# Patient Record
Sex: FEMALE | Race: WHITE | Employment: FULL TIME | URBAN - METROPOLITAN AREA
[De-identification: names, ages, dates, MRNs, and addresses within clinical notes are randomized per-mention and may not be internally consistent; named-entity substitution may affect disease eponyms.]

---

## 2022-03-08 ENCOUNTER — APPOINTMENT (OUTPATIENT)
Dept: GENERAL RADIOLOGY | Age: 62
End: 2022-03-08
Payer: COMMERCIAL

## 2022-03-08 ENCOUNTER — HOSPITAL ENCOUNTER (OUTPATIENT)
Age: 62
Setting detail: OBSERVATION
Discharge: HOME | End: 2022-03-10
Attending: EMERGENCY MEDICINE | Admitting: EMERGENCY MEDICINE
Payer: COMMERCIAL

## 2022-03-08 ENCOUNTER — APPOINTMENT (OUTPATIENT)
Dept: CT IMAGING | Age: 62
End: 2022-03-08
Payer: COMMERCIAL

## 2022-03-08 DIAGNOSIS — R07.9 CHEST PAIN, UNSPECIFIED TYPE: Primary | ICD-10-CM

## 2022-03-08 LAB
ABSOLUTE EOS #: 0.03 K/UL (ref 0–0.44)
ABSOLUTE IMMATURE GRANULOCYTE: 0.04 K/UL (ref 0–0.3)
ABSOLUTE LYMPH #: 0.8 K/UL (ref 1.1–3.7)
ABSOLUTE MONO #: 0.84 K/UL (ref 0.1–1.2)
ANION GAP SERPL CALCULATED.3IONS-SCNC: 17 MMOL/L (ref 9–17)
ANION GAP: 14 MMOL/L (ref 7–16)
BASOPHILS # BLD: 0 % (ref 0–2)
BASOPHILS ABSOLUTE: 0.04 K/UL (ref 0–0.2)
BUN BLDV-MCNC: 11 MG/DL (ref 8–23)
CALCIUM SERPL-MCNC: 9.8 MG/DL (ref 8.6–10.4)
CHLORIDE BLD-SCNC: 96 MMOL/L (ref 98–107)
CO2: 22 MMOL/L (ref 20–31)
CREAT SERPL-MCNC: 1.01 MG/DL (ref 0.5–0.9)
EOSINOPHILS RELATIVE PERCENT: 0 % (ref 1–4)
GFR AFRICAN AMERICAN: >60 ML/MIN
GFR NON-AFRICAN AMERICAN: 55 ML/MIN
GFR NON-AFRICAN AMERICAN: 59 ML/MIN
GFR SERPL CREATININE-BSD FRML MDRD: >60 ML/MIN
GFR SERPL CREATININE-BSD FRML MDRD: ABNORMAL ML/MIN/{1.73_M2}
GFR SERPL CREATININE-BSD FRML MDRD: ABNORMAL ML/MIN/{1.73_M2}
GLUCOSE BLD-MCNC: 158 MG/DL (ref 74–100)
GLUCOSE BLD-MCNC: 160 MG/DL (ref 70–99)
HCO3 VENOUS: 25.3 MMOL/L (ref 22–29)
HCT VFR BLD CALC: 45.3 % (ref 36.3–47.1)
HEMOGLOBIN: 14.7 G/DL (ref 11.9–15.1)
IMMATURE GRANULOCYTES: 0 %
LYMPHOCYTES # BLD: 8 % (ref 24–43)
MAGNESIUM: 1.9 MG/DL (ref 1.6–2.6)
MCH RBC QN AUTO: 30.7 PG (ref 25.2–33.5)
MCHC RBC AUTO-ENTMCNC: 32.5 G/DL (ref 28.4–34.8)
MCV RBC AUTO: 94.6 FL (ref 82.6–102.9)
MONOCYTES # BLD: 9 % (ref 3–12)
NEGATIVE BASE EXCESS, VEN: 1 (ref 0–2)
NRBC AUTOMATED: 0 PER 100 WBC
O2 SAT, VEN: 28 % (ref 60–85)
PCO2, VEN: 46.1 MM HG (ref 41–51)
PDW BLD-RTO: 13.4 % (ref 11.8–14.4)
PH VENOUS: 7.35 (ref 7.32–7.43)
PLATELET # BLD: 338 K/UL (ref 138–453)
PMV BLD AUTO: 9.7 FL (ref 8.1–13.5)
PO2, VEN: 19.5 MM HG (ref 30–50)
POC BUN: 10 MG/DL (ref 8–26)
POC CHLORIDE: 102 MMOL/L (ref 98–107)
POC CREATININE: 0.95 MG/DL (ref 0.51–1.19)
POC HEMATOCRIT: 47 % (ref 36–46)
POC HEMOGLOBIN: 16.1 G/DL (ref 12–16)
POC IONIZED CALCIUM: 1.19 MMOL/L (ref 1.15–1.33)
POC LACTIC ACID: 2.67 MMOL/L (ref 0.56–1.39)
POC POTASSIUM: 3.3 MMOL/L (ref 3.5–4.5)
POC SODIUM: 141 MMOL/L (ref 138–146)
POC TCO2: 26 MMOL/L (ref 22–30)
POTASSIUM SERPL-SCNC: 3.4 MMOL/L (ref 3.7–5.3)
PRO-BNP: 31 PG/ML
RBC # BLD: 4.79 M/UL (ref 3.95–5.11)
SEG NEUTROPHILS: 83 % (ref 36–65)
SEGMENTED NEUTROPHILS ABSOLUTE COUNT: 8 K/UL (ref 1.5–8.1)
SODIUM BLD-SCNC: 135 MMOL/L (ref 135–144)
TROPONIN, HIGH SENSITIVITY: 29 NG/L (ref 0–14)
TROPONIN, HIGH SENSITIVITY: 33 NG/L (ref 0–14)
WBC # BLD: 9.8 K/UL (ref 3.5–11.3)

## 2022-03-08 PROCEDURE — 84520 ASSAY OF UREA NITROGEN: CPT

## 2022-03-08 PROCEDURE — 96360 HYDRATION IV INFUSION INIT: CPT

## 2022-03-08 PROCEDURE — 85025 COMPLETE CBC W/AUTO DIFF WBC: CPT

## 2022-03-08 PROCEDURE — 6370000000 HC RX 637 (ALT 250 FOR IP): Performed by: EMERGENCY MEDICINE

## 2022-03-08 PROCEDURE — 85014 HEMATOCRIT: CPT

## 2022-03-08 PROCEDURE — 96361 HYDRATE IV INFUSION ADD-ON: CPT

## 2022-03-08 PROCEDURE — 82947 ASSAY GLUCOSE BLOOD QUANT: CPT

## 2022-03-08 PROCEDURE — G0378 HOSPITAL OBSERVATION PER HR: HCPCS

## 2022-03-08 PROCEDURE — 93005 ELECTROCARDIOGRAM TRACING: CPT | Performed by: EMERGENCY MEDICINE

## 2022-03-08 PROCEDURE — 99285 EMERGENCY DEPT VISIT HI MDM: CPT

## 2022-03-08 PROCEDURE — 80051 ELECTROLYTE PANEL: CPT

## 2022-03-08 PROCEDURE — 83735 ASSAY OF MAGNESIUM: CPT

## 2022-03-08 PROCEDURE — 82330 ASSAY OF CALCIUM: CPT

## 2022-03-08 PROCEDURE — 83605 ASSAY OF LACTIC ACID: CPT

## 2022-03-08 PROCEDURE — 6360000004 HC RX CONTRAST MEDICATION: Performed by: EMERGENCY MEDICINE

## 2022-03-08 PROCEDURE — 83880 ASSAY OF NATRIURETIC PEPTIDE: CPT

## 2022-03-08 PROCEDURE — 87635 SARS-COV-2 COVID-19 AMP PRB: CPT

## 2022-03-08 PROCEDURE — 82565 ASSAY OF CREATININE: CPT

## 2022-03-08 PROCEDURE — 71045 X-RAY EXAM CHEST 1 VIEW: CPT

## 2022-03-08 PROCEDURE — 70496 CT ANGIOGRAPHY HEAD: CPT

## 2022-03-08 PROCEDURE — 84484 ASSAY OF TROPONIN QUANT: CPT

## 2022-03-08 PROCEDURE — 80048 BASIC METABOLIC PNL TOTAL CA: CPT

## 2022-03-08 PROCEDURE — 70450 CT HEAD/BRAIN W/O DYE: CPT

## 2022-03-08 PROCEDURE — 82803 BLOOD GASES ANY COMBINATION: CPT

## 2022-03-08 PROCEDURE — 2580000003 HC RX 258: Performed by: EMERGENCY MEDICINE

## 2022-03-08 RX ORDER — ACETAMINOPHEN 650 MG/1
650 SUPPOSITORY RECTAL EVERY 6 HOURS PRN
Status: DISCONTINUED | OUTPATIENT
Start: 2022-03-08 | End: 2022-03-10 | Stop reason: HOSPADM

## 2022-03-08 RX ORDER — POLYETHYLENE GLYCOL 3350 17 G/17G
17 POWDER, FOR SOLUTION ORAL DAILY PRN
Status: DISCONTINUED | OUTPATIENT
Start: 2022-03-08 | End: 2022-03-10 | Stop reason: HOSPADM

## 2022-03-08 RX ORDER — 0.9 % SODIUM CHLORIDE 0.9 %
500 INTRAVENOUS SOLUTION INTRAVENOUS ONCE
Status: COMPLETED | OUTPATIENT
Start: 2022-03-08 | End: 2022-03-08

## 2022-03-08 RX ORDER — SODIUM CHLORIDE 0.9 % (FLUSH) 0.9 %
5-40 SYRINGE (ML) INJECTION PRN
Status: DISCONTINUED | OUTPATIENT
Start: 2022-03-08 | End: 2022-03-10 | Stop reason: HOSPADM

## 2022-03-08 RX ORDER — ONDANSETRON 4 MG/1
4 TABLET, ORALLY DISINTEGRATING ORAL EVERY 8 HOURS PRN
Status: DISCONTINUED | OUTPATIENT
Start: 2022-03-08 | End: 2022-03-10 | Stop reason: HOSPADM

## 2022-03-08 RX ORDER — SODIUM CHLORIDE 9 MG/ML
25 INJECTION, SOLUTION INTRAVENOUS PRN
Status: DISCONTINUED | OUTPATIENT
Start: 2022-03-08 | End: 2022-03-10 | Stop reason: HOSPADM

## 2022-03-08 RX ORDER — SODIUM CHLORIDE 0.9 % (FLUSH) 0.9 %
5-40 SYRINGE (ML) INJECTION EVERY 12 HOURS SCHEDULED
Status: DISCONTINUED | OUTPATIENT
Start: 2022-03-08 | End: 2022-03-10 | Stop reason: HOSPADM

## 2022-03-08 RX ORDER — POTASSIUM CHLORIDE 20 MEQ/1
20 TABLET, EXTENDED RELEASE ORAL ONCE
Status: COMPLETED | OUTPATIENT
Start: 2022-03-08 | End: 2022-03-08

## 2022-03-08 RX ORDER — ACETAMINOPHEN 325 MG/1
650 TABLET ORAL EVERY 6 HOURS PRN
Status: DISCONTINUED | OUTPATIENT
Start: 2022-03-08 | End: 2022-03-10 | Stop reason: HOSPADM

## 2022-03-08 RX ORDER — ONDANSETRON 2 MG/ML
4 INJECTION INTRAMUSCULAR; INTRAVENOUS EVERY 6 HOURS PRN
Status: DISCONTINUED | OUTPATIENT
Start: 2022-03-08 | End: 2022-03-10 | Stop reason: HOSPADM

## 2022-03-08 RX ADMIN — IOPAMIDOL 90 ML: 755 INJECTION, SOLUTION INTRAVENOUS at 20:56

## 2022-03-08 RX ADMIN — POTASSIUM CHLORIDE 20 MEQ: 1500 TABLET, EXTENDED RELEASE ORAL at 22:26

## 2022-03-08 RX ADMIN — SODIUM CHLORIDE 500 ML: 0.9 INJECTION, SOLUTION INTRAVENOUS at 21:08

## 2022-03-08 ASSESSMENT — ENCOUNTER SYMPTOMS
NAUSEA: 0
CONSTIPATION: 0
COLOR CHANGE: 0
VOMITING: 0
CHEST TIGHTNESS: 1
DIARRHEA: 0
ABDOMINAL PAIN: 0
SHORTNESS OF BREATH: 1

## 2022-03-09 ENCOUNTER — APPOINTMENT (OUTPATIENT)
Dept: NUCLEAR MEDICINE | Age: 62
End: 2022-03-09
Payer: COMMERCIAL

## 2022-03-09 ENCOUNTER — APPOINTMENT (OUTPATIENT)
Dept: MRI IMAGING | Age: 62
End: 2022-03-09
Payer: COMMERCIAL

## 2022-03-09 LAB
ABSOLUTE EOS #: 0.06 K/UL (ref 0–0.44)
ABSOLUTE IMMATURE GRANULOCYTE: 0 K/UL (ref 0–0.3)
ABSOLUTE LYMPH #: 0.66 K/UL (ref 1.1–3.7)
ABSOLUTE MONO #: 0.72 K/UL (ref 0.1–1.2)
ANION GAP SERPL CALCULATED.3IONS-SCNC: 13 MMOL/L (ref 9–17)
BASOPHILS # BLD: 1 % (ref 0–2)
BASOPHILS ABSOLUTE: 0.06 K/UL (ref 0–0.2)
BUN BLDV-MCNC: 9 MG/DL (ref 8–23)
CALCIUM SERPL-MCNC: 9.5 MG/DL (ref 8.6–10.4)
CHLORIDE BLD-SCNC: 98 MMOL/L (ref 98–107)
CO2: 21 MMOL/L (ref 20–31)
CREAT SERPL-MCNC: 0.8 MG/DL (ref 0.5–0.9)
EKG ATRIAL RATE: 116 BPM
EKG ATRIAL RATE: 83 BPM
EKG P AXIS: 13 DEGREES
EKG P AXIS: 48 DEGREES
EKG P-R INTERVAL: 134 MS
EKG P-R INTERVAL: 140 MS
EKG Q-T INTERVAL: 328 MS
EKG Q-T INTERVAL: 380 MS
EKG QRS DURATION: 86 MS
EKG QRS DURATION: 86 MS
EKG QTC CALCULATION (BAZETT): 446 MS
EKG QTC CALCULATION (BAZETT): 455 MS
EKG R AXIS: 31 DEGREES
EKG R AXIS: 51 DEGREES
EKG T AXIS: 54 DEGREES
EKG T AXIS: 73 DEGREES
EKG VENTRICULAR RATE: 116 BPM
EKG VENTRICULAR RATE: 83 BPM
EOSINOPHILS RELATIVE PERCENT: 1 % (ref 1–4)
ESTIMATED AVERAGE GLUCOSE: 163 MG/DL
GFR AFRICAN AMERICAN: >60 ML/MIN
GFR NON-AFRICAN AMERICAN: >60 ML/MIN
GFR SERPL CREATININE-BSD FRML MDRD: ABNORMAL ML/MIN/{1.73_M2}
GLUCOSE BLD-MCNC: 118 MG/DL (ref 65–105)
GLUCOSE BLD-MCNC: 128 MG/DL (ref 65–105)
GLUCOSE BLD-MCNC: 136 MG/DL (ref 65–105)
GLUCOSE BLD-MCNC: 137 MG/DL (ref 70–99)
GLUCOSE BLD-MCNC: 141 MG/DL (ref 65–105)
GLUCOSE BLD-MCNC: 169 MG/DL (ref 65–105)
HBA1C MFR BLD: 7.3 % (ref 4–6)
HCT VFR BLD CALC: 41.3 % (ref 36.3–47.1)
HEMOGLOBIN: 13.7 G/DL (ref 11.9–15.1)
IMMATURE GRANULOCYTES: 0 %
LV EF: 63 %
LVEF MODALITY: NORMAL
LYMPHOCYTES # BLD: 12 % (ref 24–43)
MCH RBC QN AUTO: 30.9 PG (ref 25.2–33.5)
MCHC RBC AUTO-ENTMCNC: 33.2 G/DL (ref 28.4–34.8)
MCV RBC AUTO: 93.2 FL (ref 82.6–102.9)
MONOCYTES # BLD: 13 % (ref 3–12)
MORPHOLOGY: NORMAL
NRBC AUTOMATED: 0 PER 100 WBC
PDW BLD-RTO: 13.7 % (ref 11.8–14.4)
PLATELET # BLD: 325 K/UL (ref 138–453)
PMV BLD AUTO: 10 FL (ref 8.1–13.5)
POTASSIUM SERPL-SCNC: 4 MMOL/L (ref 3.7–5.3)
RBC # BLD: 4.43 M/UL (ref 3.95–5.11)
SARS-COV-2, RAPID: NOT DETECTED
SEG NEUTROPHILS: 73 % (ref 36–65)
SEGMENTED NEUTROPHILS ABSOLUTE COUNT: 4 K/UL (ref 1.5–8.1)
SODIUM BLD-SCNC: 132 MMOL/L (ref 135–144)
SPECIMEN DESCRIPTION: NORMAL
TROPONIN, HIGH SENSITIVITY: 22 NG/L (ref 0–14)
TROPONIN, HIGH SENSITIVITY: 26 NG/L (ref 0–14)
TSH SERPL DL<=0.05 MIU/L-ACNC: 1.32 MIU/L (ref 0.3–5)
WBC # BLD: 5.5 K/UL (ref 3.5–11.3)

## 2022-03-09 PROCEDURE — 6370000000 HC RX 637 (ALT 250 FOR IP): Performed by: EMERGENCY MEDICINE

## 2022-03-09 PROCEDURE — 93010 ELECTROCARDIOGRAM REPORT: CPT | Performed by: INTERNAL MEDICINE

## 2022-03-09 PROCEDURE — G0378 HOSPITAL OBSERVATION PER HR: HCPCS

## 2022-03-09 PROCEDURE — 3430000000 HC RX DIAGNOSTIC RADIOPHARMACEUTICAL: Performed by: EMERGENCY MEDICINE

## 2022-03-09 PROCEDURE — 80048 BASIC METABOLIC PNL TOTAL CA: CPT

## 2022-03-09 PROCEDURE — 6370000000 HC RX 637 (ALT 250 FOR IP): Performed by: STUDENT IN AN ORGANIZED HEALTH CARE EDUCATION/TRAINING PROGRAM

## 2022-03-09 PROCEDURE — 83036 HEMOGLOBIN GLYCOSYLATED A1C: CPT

## 2022-03-09 PROCEDURE — 82947 ASSAY GLUCOSE BLOOD QUANT: CPT

## 2022-03-09 PROCEDURE — 84443 ASSAY THYROID STIM HORMONE: CPT

## 2022-03-09 PROCEDURE — 84484 ASSAY OF TROPONIN QUANT: CPT

## 2022-03-09 PROCEDURE — 36415 COLL VENOUS BLD VENIPUNCTURE: CPT

## 2022-03-09 PROCEDURE — 70551 MRI BRAIN STEM W/O DYE: CPT

## 2022-03-09 PROCEDURE — 93017 CV STRESS TEST TRACING ONLY: CPT

## 2022-03-09 PROCEDURE — A9500 TC99M SESTAMIBI: HCPCS | Performed by: EMERGENCY MEDICINE

## 2022-03-09 PROCEDURE — 78452 HT MUSCLE IMAGE SPECT MULT: CPT

## 2022-03-09 PROCEDURE — 93005 ELECTROCARDIOGRAM TRACING: CPT | Performed by: EMERGENCY MEDICINE

## 2022-03-09 PROCEDURE — 85025 COMPLETE CBC W/AUTO DIFF WBC: CPT

## 2022-03-09 PROCEDURE — 99219 PR INITIAL OBSERVATION CARE/DAY 50 MINUTES: CPT | Performed by: PSYCHIATRY & NEUROLOGY

## 2022-03-09 PROCEDURE — 6360000002 HC RX W HCPCS: Performed by: EMERGENCY MEDICINE

## 2022-03-09 PROCEDURE — 2580000003 HC RX 258: Performed by: EMERGENCY MEDICINE

## 2022-03-09 RX ORDER — ATROPINE SULFATE 0.1 MG/ML
0.5 INJECTION INTRAVENOUS EVERY 5 MIN PRN
Status: DISCONTINUED | OUTPATIENT
Start: 2022-03-09 | End: 2022-03-09 | Stop reason: ALTCHOICE

## 2022-03-09 RX ORDER — ATORVASTATIN CALCIUM 40 MG/1
40 TABLET, FILM COATED ORAL DAILY
Status: DISCONTINUED | OUTPATIENT
Start: 2022-03-09 | End: 2022-03-10

## 2022-03-09 RX ORDER — PANTOPRAZOLE SODIUM 40 MG/1
40 TABLET, DELAYED RELEASE ORAL
Status: DISCONTINUED | OUTPATIENT
Start: 2022-03-09 | End: 2022-03-10 | Stop reason: HOSPADM

## 2022-03-09 RX ORDER — NITROGLYCERIN 0.4 MG/1
0.4 TABLET SUBLINGUAL EVERY 5 MIN PRN
Status: DISCONTINUED | OUTPATIENT
Start: 2022-03-09 | End: 2022-03-09 | Stop reason: ALTCHOICE

## 2022-03-09 RX ORDER — CLOPIDOGREL BISULFATE 75 MG/1
75 TABLET ORAL DAILY
Status: DISCONTINUED | OUTPATIENT
Start: 2022-03-10 | End: 2022-03-10 | Stop reason: HOSPADM

## 2022-03-09 RX ORDER — CLOPIDOGREL 300 MG/1
300 TABLET, FILM COATED ORAL ONCE
Status: COMPLETED | OUTPATIENT
Start: 2022-03-09 | End: 2022-03-09

## 2022-03-09 RX ORDER — METOPROLOL TARTRATE 5 MG/5ML
5 INJECTION INTRAVENOUS EVERY 5 MIN PRN
Status: DISCONTINUED | OUTPATIENT
Start: 2022-03-09 | End: 2022-03-09 | Stop reason: ALTCHOICE

## 2022-03-09 RX ORDER — AMINOPHYLLINE DIHYDRATE 25 MG/ML
50 INJECTION, SOLUTION INTRAVENOUS PRN
Status: DISCONTINUED | OUTPATIENT
Start: 2022-03-09 | End: 2022-03-09 | Stop reason: ALTCHOICE

## 2022-03-09 RX ORDER — MELOXICAM 5 MG/1
5 CAPSULE ORAL DAILY
Status: DISCONTINUED | OUTPATIENT
Start: 2022-03-09 | End: 2022-03-10 | Stop reason: HOSPADM

## 2022-03-09 RX ORDER — CITALOPRAM 20 MG/1
40 TABLET ORAL DAILY
Status: DISCONTINUED | OUTPATIENT
Start: 2022-03-09 | End: 2022-03-10 | Stop reason: HOSPADM

## 2022-03-09 RX ORDER — SODIUM CHLORIDE 0.9 % (FLUSH) 0.9 %
10 SYRINGE (ML) INJECTION PRN
Status: DISCONTINUED | OUTPATIENT
Start: 2022-03-09 | End: 2022-03-10 | Stop reason: HOSPADM

## 2022-03-09 RX ORDER — ALBUTEROL SULFATE 90 UG/1
2 AEROSOL, METERED RESPIRATORY (INHALATION) PRN
Status: DISCONTINUED | OUTPATIENT
Start: 2022-03-09 | End: 2022-03-09 | Stop reason: ALTCHOICE

## 2022-03-09 RX ORDER — ASPIRIN 81 MG/1
81 TABLET, CHEWABLE ORAL DAILY
Status: DISCONTINUED | OUTPATIENT
Start: 2022-03-10 | End: 2022-03-10 | Stop reason: HOSPADM

## 2022-03-09 RX ORDER — ASPIRIN 325 MG
325 TABLET ORAL ONCE
Status: COMPLETED | OUTPATIENT
Start: 2022-03-09 | End: 2022-03-09

## 2022-03-09 RX ORDER — AMLODIPINE BESYLATE 10 MG/1
10 TABLET ORAL DAILY
Status: DISCONTINUED | OUTPATIENT
Start: 2022-03-09 | End: 2022-03-10 | Stop reason: HOSPADM

## 2022-03-09 RX ORDER — LISINOPRIL 5 MG/1
5 TABLET ORAL DAILY
Status: DISCONTINUED | OUTPATIENT
Start: 2022-03-09 | End: 2022-03-10 | Stop reason: HOSPADM

## 2022-03-09 RX ORDER — IBUPROFEN 400 MG/1
600 TABLET ORAL EVERY 6 HOURS PRN
Status: DISCONTINUED | OUTPATIENT
Start: 2022-03-09 | End: 2022-03-10 | Stop reason: HOSPADM

## 2022-03-09 RX ORDER — SODIUM CHLORIDE 9 MG/ML
500 INJECTION, SOLUTION INTRAVENOUS CONTINUOUS PRN
Status: DISCONTINUED | OUTPATIENT
Start: 2022-03-09 | End: 2022-03-09 | Stop reason: ALTCHOICE

## 2022-03-09 RX ORDER — SODIUM CHLORIDE 0.9 % (FLUSH) 0.9 %
5-40 SYRINGE (ML) INJECTION PRN
Status: DISCONTINUED | OUTPATIENT
Start: 2022-03-09 | End: 2022-03-09 | Stop reason: ALTCHOICE

## 2022-03-09 RX ORDER — ISOSORBIDE MONONITRATE 30 MG/1
30 TABLET, EXTENDED RELEASE ORAL DAILY
Status: DISCONTINUED | OUTPATIENT
Start: 2022-03-09 | End: 2022-03-10 | Stop reason: HOSPADM

## 2022-03-09 RX ADMIN — METOPROLOL TARTRATE 25 MG: 25 TABLET ORAL at 12:42

## 2022-03-09 RX ADMIN — REGADENOSON 0.4 MG: 0.08 INJECTION, SOLUTION INTRAVENOUS at 10:39

## 2022-03-09 RX ADMIN — METOPROLOL TARTRATE 25 MG: 25 TABLET ORAL at 01:58

## 2022-03-09 RX ADMIN — PANTOPRAZOLE SODIUM 40 MG: 40 TABLET, DELAYED RELEASE ORAL at 12:42

## 2022-03-09 RX ADMIN — TETRAKIS(2-METHOXYISOBUTYLISOCYANIDE)COPPER(I) TETRAFLUOROBORATE 13 MILLICURIE: 1 INJECTION, POWDER, LYOPHILIZED, FOR SOLUTION INTRAVENOUS at 09:00

## 2022-03-09 RX ADMIN — SODIUM CHLORIDE, PRESERVATIVE FREE 10 ML: 5 INJECTION INTRAVENOUS at 01:39

## 2022-03-09 RX ADMIN — CITALOPRAM 40 MG: 20 TABLET, FILM COATED ORAL at 12:42

## 2022-03-09 RX ADMIN — SODIUM CHLORIDE, PRESERVATIVE FREE 10 ML: 5 INJECTION INTRAVENOUS at 09:00

## 2022-03-09 RX ADMIN — CLOPIDOGREL BISULFATE 300 MG: 300 TABLET, FILM COATED ORAL at 14:43

## 2022-03-09 RX ADMIN — SODIUM CHLORIDE, PRESERVATIVE FREE 10 ML: 5 INJECTION INTRAVENOUS at 20:34

## 2022-03-09 RX ADMIN — SODIUM CHLORIDE, PRESERVATIVE FREE 10 ML: 5 INJECTION INTRAVENOUS at 10:37

## 2022-03-09 RX ADMIN — AMLODIPINE BESYLATE 10 MG: 10 TABLET ORAL at 12:42

## 2022-03-09 RX ADMIN — ISOSORBIDE MONONITRATE 30 MG: 30 TABLET ORAL at 12:42

## 2022-03-09 RX ADMIN — TETRAKIS(2-METHOXYISOBUTYLISOCYANIDE)COPPER(I) TETRAFLUOROBORATE 42 MILLICURIE: 1 INJECTION, POWDER, LYOPHILIZED, FOR SOLUTION INTRAVENOUS at 10:37

## 2022-03-09 RX ADMIN — METOPROLOL TARTRATE 25 MG: 25 TABLET ORAL at 20:34

## 2022-03-09 RX ADMIN — DESMOPRESSIN ACETATE 40 MG: 0.2 TABLET ORAL at 12:42

## 2022-03-09 RX ADMIN — IBUPROFEN 600 MG: 400 TABLET, FILM COATED ORAL at 23:26

## 2022-03-09 RX ADMIN — SODIUM CHLORIDE, PRESERVATIVE FREE 10 ML: 5 INJECTION INTRAVENOUS at 08:47

## 2022-03-09 RX ADMIN — ASPIRIN 325 MG: 325 TABLET ORAL at 14:43

## 2022-03-09 RX ADMIN — SODIUM CHLORIDE, PRESERVATIVE FREE 10 ML: 5 INJECTION INTRAVENOUS at 10:40

## 2022-03-09 RX ADMIN — ACETAMINOPHEN 650 MG: 325 TABLET ORAL at 20:34

## 2022-03-09 RX ADMIN — LISINOPRIL 5 MG: 5 TABLET ORAL at 12:42

## 2022-03-09 ASSESSMENT — PAIN SCALES - GENERAL
PAINLEVEL_OUTOF10: 0
PAINLEVEL_OUTOF10: 5
PAINLEVEL_OUTOF10: 8
PAINLEVEL_OUTOF10: 0
PAINLEVEL_OUTOF10: 4
PAINLEVEL_OUTOF10: 0
PAINLEVEL_OUTOF10: 0
PAINLEVEL_OUTOF10: 3

## 2022-03-09 ASSESSMENT — ENCOUNTER SYMPTOMS
COUGH: 0
TROUBLE SWALLOWING: 0
CONSTIPATION: 0
VOMITING: 0
DIARRHEA: 0
BACK PAIN: 0
ABDOMINAL DISTENTION: 0
SHORTNESS OF BREATH: 0
ABDOMINAL PAIN: 0
CHEST TIGHTNESS: 0
SORE THROAT: 0

## 2022-03-09 ASSESSMENT — PAIN - FUNCTIONAL ASSESSMENT: PAIN_FUNCTIONAL_ASSESSMENT: ACTIVITIES ARE NOT PREVENTED

## 2022-03-09 ASSESSMENT — PAIN DESCRIPTION - PROGRESSION: CLINICAL_PROGRESSION: GRADUALLY IMPROVING

## 2022-03-09 NOTE — CONSULTS
citalopram  40 mg Oral Daily    isosorbide mononitrate  30 mg Oral Daily    lisinopril  5 mg Oral Daily    Meloxicam  5 mg Oral Daily    metoprolol tartrate  25 mg Oral BID    pantoprazole  40 mg Oral QAM AC    atorvastatin  40 mg Oral Daily    sodium chloride flush  5-40 mL IntraVENous 2 times per day    enoxaparin  40 mg SubCUTAneous Daily       Allergies:   Natasha Dailey is allergic to bactrim [sulfamethoxazole-trimethoprim]. ROS:   Constitutional Negative for fever and chills   HEENT Negative for ear discharge, ear pain, nosebleed   Eyes Negative for photophobia, pain and discharge   Respiratory Negative for hemoptysis and sputum   Cardiovascular Negative for orthopnea, claudication and PND   Gastrointestinal Negative for abdominal pain, diarrhea, blood in stool   Musculoskeletal Negative for joint pain, negative for myalgia   Skin Negative for rash or itching   hematology Negative for ecchymosis, anemia   Psychiatric Negative for suicidal ideation, anxiety, depression, hallucinations       Objective:   BP (!) 144/101   Pulse 99   Temp 97 °F (36.1 °C) (Oral)   Resp 18   Ht 5' 7\" (1.702 m)   Wt 210 lb (95.3 kg)   SpO2 97%   BMI 32.89 kg/m²         General examination:    HEENT: Normocephalic, atraumatic, neck supple (-)nuchal rigidity  Lungs: Respirations unlabored, chest wall no deformity  Heart: Regular rate rhythm  Abdomen: Soft, non distended, non tender  Extremities: No cyanosis, clubbing or edema, 2+ pulses  Skin: Intact, normal skin color, no ecchymosis    Neurological examination:    Mental status   Alert and oriented x 3; following all commands; speech is fluent, no dysarthria, aphasia.       Cranial nerves   II - visual fields intact to confrontation; pupils reactive  III, IV, VI  extraocular muscles intact; no AIMEE; no nystagmus; no ptosis   V - normal facial sensation                                                               VII - normal facial symmetry VIII - intact hearing                                                                             IX, X - symmetrical palate elevation                                               XI - symmetrical shoulder shrug                                                       XII - midline tongue without atrophy or fasciculation     Motor function  Strength: 5/5 RUE, 5/5 RLE, 5/5 LUE, 5/5  LLE  Normal bulk and tone. No tremors                      Sensory function Intact to touch, pin, vibration, proprioception throughout     Cerebellar Intact finger-nose-finger testing. Intact heel-shin testing. No dysdiadochokinesia present. Reflex function 2/4 symmetric throughout . Downgoing plantar response bilaterally. (-)Burleson's sign bilaterally    Gait                  Normal station and gait           LABS:    CBC:   Recent Labs     03/08/22 2047 03/09/22  0651   WBC 9.8 5.5   HGB 14.7 13.7    325     BMP:    Recent Labs     03/08/22 2028 03/08/22 2047 03/09/22  0651   NA  --  135 132*   K  --  3.4* 4.0   CL  --  96* 98   CO2  --  22 21   BUN  --  11 9   CREATININE 0.95 1.01* 0.80   GLUCOSE  --  160* 137*         Lab Results   Component Value Date    CHOL 234 (H) 06/02/2016    LDLCHOLESTEROL 147 (H) 06/02/2016    HDL 43 06/02/2016    TRIG 222 (H) 06/02/2016    ALT 28 06/02/2016    AST 26 06/02/2016    TSH 0.67 06/02/2016    LABA1C 6.1 (H) 06/03/2016       No results found for: PHENYTOIN, PHENYTOIN, VALPROATE, CBMZ    IMAGING:   CT head -no intracranial abnormality    CTA head and neck -no large vessel stenosis no intracranial abnormality       All of the above medications, clinical laboratory, imaging and other diagnostic tests were reviewed by myself. IMPRESSION:   78-year-old who presents after having chest pain and a transient aphasic episode. Patient has had 1 similar episode in the past which was of shorter duration.   There was no associated vision loss, upper or lower extremity weakness. She has no history of strokes or seizures. Her symptoms have completely dissipated, stress test has been negative for any acute ischemic injury. CT and CTA did not show any acute process, however we will proceed with a stroke work-up/TIA and continue to monitor. She will be loaded with aspirin and Plavix. neurologicalrule out ischemic stroke  -Continue to monitor for any neurological changes   - we will load with aspirin and Plavix  -Continue aspirin 81 and Plavix 75 daily.  -Continue statin therapy  -Stroke work-up including. -TSH/A1c/lipid profile-  -Echocardiogram with bubble study  -MRI without contrast  -We will continue to monitor and follow . Thank you for this very interesting consultation.       Electronically signed by Sugar Ceballos MD on 3/9/2022 at 8:37 AM      Sugar Ceballos MD  [facility].  Neurology

## 2022-03-09 NOTE — ED NOTES
Report to [facility], all questions addressed. Pt to get 3rd trop resulted prior to going to floor.       Saleem Fischer RN  03/09/22 4255

## 2022-03-09 NOTE — ED PROVIDER NOTES
[facility] ED  eMERGENCY dEPARTMENT eNCOUnter   Attending Attestation     Pt Name: Shilpi Diaz  MRN: [id number]  [place] 1960  Date of evaluation: 3/8/22       Shilpi Diaz is a 57 y.o. female who presents with Chest Pain and Aphasia      History: Patient presents with chest pain and difficulty with speech and slurred speech. Patient was noted to be at work today when she developed chest pain and rapid heart rate with palpitations and some shortness of breath. Patient was given aspirin and nitro after this patient started to have some aphasia. This was short-lived and resolved. Patient says the chest pain was resolved with aspirin nitro. Exam: Heart rate and rhythm are regular. Lungs are clear to auscultation bilaterally. Abdomen is soft, nontender. Patient is awake and alert and acting appropriately. Patient has no slurred speech. Plan for ACS work-up as well as CT head and CTA head and neck. Or if patient symptoms of slurred speech return if there are any other concerns we will call stroke alert as needed. We will discuss with neuro if there are any findings. If patient continues to have any symptoms      EKG shows sinus tachycardia with rate of 116. Normal axis. No ST elevation depression. T waves are upright. No blocks arrhythmias. Nonspecific EKG      I performed a history and physical examination of the patient and discussed management with the resident. I reviewed the residents note and agree with the documented findings and plan of care. Any areas of disagreement are noted on the chart. I was personally present for the key portions of any procedures. I have documented in the chart those procedures where I was not present during the key portions. I have personally reviewed all images and agree with the resident's interpretation. I have reviewed the emergency nurses triage note.  I agree with the chief complaint, past medical history, past surgical history, allergies, medications, social and family history as documented unless otherwise noted below. Documentation of the HPI, Physical Exam and Medical Decision Making performed by medical students or scribes is based on my personal performance of the HPI, PE and MDM. For Phys Assistant/ Nurse Practitioner cases/documentation I have had a face to face evaluation of this patient and have completed at least one if not all key elements of the E/M (history, physical exam, and MDM). Additional findings are as noted. For APC cases I have personally evaluated and examined the patient in conjunction with the APC and agree with the treatment plan and disposition of the patient as recorded by the APC.     Aliya Johnson MD  Attending Emergency  Physician       Irvin Yeung MD  03/08/22 9164

## 2022-03-09 NOTE — CARE COORDINATION
Case Management Initial Discharge [address] [place] [address],             Met with:patient to discuss discharge plans. Information verified: address, contacts, phone number, , insurance Yes  Insurance Provider: Marvin    Emergency Contact/Next of Kin name & number: Tera Gonzalez (son) [phone]  Who are involved in patient's support system? family    PCP: Ras Mcleod MD  Date of last visit: 4-6 weeks ago      Discharge Planning    Living Arrangements:  Family Members     Home has 2 stories   stairs to climb to get into front door, stairs to climb to reach second floor  Location of bedroom/bathroom in home main    Patient able to perform ADL's:Independent    Current Services (outpatient & in home) none  DME equipment: none  DME provider: none    Is patient receiving oral anticoagulation therapy? No    If indicated:   Physician managing anticoagulation treatment: no  Where does patient obtain lab work for ATC treatment? no      Potential Assistance Needed:  N/A    Patient agreeable to home care: No  [place] of choice provided:  no    Prior SNF/Rehab Placement and Facility: no  Agreeable to SNF/Rehab: No  [place] of choice provided: no     Evaluation: no    Expected Discharge date:  22    Patient expects to be discharged to: If home: is the family and/or caregiver wiling & able to provide support at home? Who will be providing this support? Follow Up Appointment: Best Day/ Time: Monday AM    Transportation provider: family  Transportation arrangements needed for discharge: No    Readmission Risk              Risk of Unplanned Readmission:  0             Does patient have a readmission risk score greater than 14?: No  If yes, follow-up appointment must be made within 7 days of discharge.      Goals of Care:       Educated patient on transitional options, provided freedom of choice and are agreeable with plan      Discharge Plan: home with family          Electronically signed by Bertha Cuellar RN on 3/9/22 at 8:39 AM EST

## 2022-03-09 NOTE — ED NOTES
Pt resting on stretcher, attached to monitor, call light in reach, RR even and non labored, states no needs at this time     Juana Omalley RN  03/08/22 2808

## 2022-03-09 NOTE — ED NOTES
Pt back to room, attached to monitor, call light in reach, RR even and non labored      Lali Vila, RN  03/08/22 5916

## 2022-03-09 NOTE — ED NOTES
The following labs labeled with pt sticker and tubed to lab:     [] Blue     [] Tera William   [] on ice  [] Green/yellow  [] Green/black [] on ice  [] Yellow  [] Red  [] Pink      [x] COVID-19 swab    [x] Rapid  [] PCR  [] Flu swab  [] Peds Viral Panel     [] Urine Sample  [] Pelvic Cultures  [] Blood Cultures            Sal Hoyt RN  03/08/22 6216

## 2022-03-09 NOTE — ED NOTES
Pt resting on stretcher, call light in reach, attached to monitor, RR even and non labored, states no needs at this time, son at bedside      [facility]  03/08/22 5633

## 2022-03-09 NOTE — PLAN OF CARE
Problem: Falls - Risk of:  Goal: Will remain free from falls  Description: Will remain free from falls  3/9/2022 0947 by Waleska Herrera RN  Outcome: Ongoing  3/9/2022 0134 by Milton Clancy RN  Outcome: Ongoing  Goal: Absence of physical injury  Description: Absence of physical injury  3/9/2022 0947 by Waleska Herrera RN  Outcome: Ongoing  3/9/2022 0134 by Milton Clancy RN  Outcome: Ongoing     Problem: Infection:  Goal: Will remain free from infection  Description: Will remain free from infection  Outcome: Ongoing     Problem: Safety:  Goal: Free from accidental physical injury  Description: Free from accidental physical injury  Outcome: Ongoing  Goal: Free from intentional harm  Description: Free from intentional harm  Outcome: Ongoing     Problem: Daily Care:  Goal: Daily care needs are met  Description: Daily care needs are met  Outcome: Ongoing     Problem: Pain:  Goal: Patient's pain/discomfort is manageable  Description: Patient's pain/discomfort is manageable  Outcome: Ongoing     Problem: Skin Integrity:  Goal: Skin integrity will stabilize  Description: Skin integrity will stabilize  Outcome: Ongoing     Problem: Discharge Planning:  Goal: Patients continuum of care needs are met  Description: Patients continuum of care needs are met  Outcome: Ongoing

## 2022-03-09 NOTE — ED NOTES
The following labs labeled with pt sticker and tubed to lab:     [] Blue     [] Lavender   [] on ice  [x] Green/yellow  [] Green/black [] on ice  [] Yellow  [] Red  [] Pink      [] COVID-19 swab    [] Rapid  [] PCR  [] Flu swab  [] Peds Viral Panel     [] Urine Sample  [] Pelvic Cultures  [] Blood Cultures            Bryant Kinsey RN  03/08/22 7830

## 2022-03-09 NOTE — PROCEDURES
[address]                  [facility]                              CARDIAC STRESS TEST    PATIENT NAME: Norbert Blackburn                   :        1960  MED REC NO:   [id number]                             ROOM:       [facility]  ACCOUNT NO:   [de-identified]                           ADMIT DATE: 2022  PROVIDER:     Margaret Alvarado    DATE OF STUDY:  2022    ORDERING PROVIDER:  Laird Halsted, MD    PRIMARY CARE PROVIDER:  Wilmer Kirkland MD    INTERPRETING PHYSICIAN:  Luis López MD    LEXISCAN STRESS STUDY:  Indication:  chest pain    Procedure was explained and consent form was signed    Medications:  Lexiscan, 0.4 mg  Resting heart rate:  95 bpm  Resting blood pressure:  163/108 mm/Hg  Infusion heart rate:  108 bpm  Infusion blood pressure:  178/115 mm/Hg  Resting EKG:  Normal  Stress heart response:  Normal response  Stress BP response:  Appropriate  Stress EKG(S):  No changes seen  Chest discomfort:  None  Ischemic EKG changes:  None    Comments:  PVC's    IMPRESSION:  Electrocardiographically negative Lexiscan stress study. Radio-isotope  results to follow from the department of Nuclear Medicine.             Yuliya Beasley    D: 2022 13:26:17       T: 2022 13:27:30     MT/LEONID  Job#: [id number]     Doc#: Unknown    CC:    ()

## 2022-03-09 NOTE — ED NOTES
Dr. Raymond Esquivel at bedside     Gavinomaral Valera, [address] [place] [address]  03/08/22 9859

## 2022-03-09 NOTE — ED NOTES
Pt to ED via [facility]. Pt was at work when she started experiencing SOB, chest tightness. Per EMS, as they were pulling up, pt started to have difficultly with speech, slurring words. Per EMS, pt received 324mg aspirin and nitro spray PTA. Pt not slurring words on arrival to room, no weakness noted. Bilateral hand strength. Pt states she is supposed to have hip surgery in near future.  Pt is a/o, resting on stretcher, attached to monitor, call light in reach, RR even and non labored     Mariangel Brandt RN  03/08/22 4605

## 2022-03-09 NOTE — ED PROVIDER NOTES
[facility] SURG  Emergency Department Encounter  EmergencyMedicine Resident     Pt Richa Chavez  MRN: [id number]  Birthdate 1960  Date of evaluation: 3/8/22  PCP:  Sandra Valerio MD    [address] [place] [address]       Chief Complaint   Patient presents with    Chest Pain    Aphasia       HISTORY OF PRESENT ILLNESS  (Location/Symptom, Timing/Onset, Context/Setting, Quality, Duration, Modifying Factors, Severity.)      Helen Vyas is a 57 y.o. female who presents with slurred speech, chest pain and shortness of breath that suddenly started while at work. Patient works at a local [employer]. Patient states that she has slurred speech and started experience chest pain and feelings of warm, diaphoresis, and shortness of breath. Patient then had noted aphasia by EMS when they pulled into the ambulance bay, patient had received aspirin and nitro prior to this. Patient reports some feelings of lightheadedness, no dizziness no abdominal pain, no change in urination or bowel habits. Patient states she has not been taking her home medications for the last couple days. Has a history of smoking, is at her 3-year anniversary of quitting tomorrow. Patient also has a history of diabetes and hypercholesterol. Patient states she is never experienced slurred speech like this before, states she has difficulty finding her words. Patient describes the chest pain as an ache, mostly resolved after the nitro and aspirin, nonradiates. PAST MEDICAL / SURGICAL / SOCIAL / FAMILY HISTORY      has a past medical history of COPD (chronic obstructive pulmonary disease) ([address] Diabetes mellitus ([address] and Hypertension. No additional pertinent     has a past surgical history that includes Ankle surgery (Left).   No additional pertinent    Social History     Socioeconomic History    Marital status:      Spouse name: Not on file    Number of children: Not on file    Years of education: Not on file    Highest education level: Not on file   Occupational History    Not on file   Tobacco Use    Smoking status: Current Every Day Smoker     Packs/day: 0.50     Types: Cigarettes    Smokeless tobacco: Not on file   Substance and Sexual Activity    Alcohol use: No    Drug use: No    Sexual activity: Yes     Partners: Male   Other Topics Concern    Not on file   Social History Narrative    Not on file     Social Determinants of Health     Financial Resource Strain:     Difficulty of Paying Living Expenses: Not on file   Food Insecurity:     Worried About Running Out of Food in the Last Year: Not on file    Tommy of Food in the Last Year: Not on file   Transportation Needs:     Lack of Transportation (Medical): Not on file    Lack of Transportation (Non-Medical): Not on file   Physical Activity:     Days of Exercise per Week: Not on file    Minutes of Exercise per Session: Not on file   Stress:     Feeling of Stress : Not on file   Social Connections:     Frequency of Communication with Friends and Family: Not on file    Frequency of Social Gatherings with Friends and Family: Not on file    Attends Caodaism Services: Not on file    Active Member of [address] [place] [address] or Organizations: Not on file    Attends Club or Organization Meetings: Not on file    Marital Status: Not on file   Intimate Partner Violence:     Fear of Current or Ex-Partner: Not on file    Emotionally Abused: Not on file    Physically Abused: Not on file    Sexually Abused: Not on file   Housing Stability:     Unable to Pay for Housing in the Last Year: Not on file    Number of [place] in the Last Year: Not on file    Unstable Housing in the Last Year: Not on file       History reviewed. No pertinent family history. Allergies:  Bactrim [sulfamethoxazole-trimethoprim]    Home Medications:  Prior to Admission medications    Medication Sig Start Date End Date Taking?  Authorizing Provider   butalbital-acetaminophen-caffeine (FIORICET, ESGIC) -40 MG per tablet Take 1 tablet by mouth every 4 hours as needed for Headaches 6/5/16   Annmarie Stark MD   isosorbide mononitrate (IMDUR) 30 MG CR tablet Take 1 tablet by mouth daily 6/5/16   Annmarie Stark MD   metoprolol (LOPRESSOR) 25 MG tablet Take 1 tablet by mouth 2 times daily 6/5/16   Annmarie Stark MD   nicotine (NICODERM CQ) 14 MG/24HR Place 1 patch onto the skin daily 6/5/16   Annmarie Stark MD   triamterene-hydrochlorothiazide (MAXZIDE-25) 37.5-25 MG per tablet Take 1 tablet by mouth daily    Historical Provider, MD   albuterol sulfate  (90 BASE) MCG/ACT inhaler Inhale 2 puffs into the lungs every 4 hours as needed for Wheezing    Historical Provider, MD   amLODIPine (NORVASC) 10 MG tablet Take 10 mg by mouth daily    Historical Provider, MD   Meloxicam 5 MG CAPS Take 5 mg by mouth daily    Historical Provider, MD   lisinopril (PRINIVIL;ZESTRIL) 5 MG tablet Take 5 mg by mouth daily    Historical Provider, MD   simvastatin (ZOCOR) 40 MG tablet Take 40 mg by mouth nightly    Historical Provider, MD   ALPRAZolam (XANAX) 0.5 MG tablet Take 0.5 mg by mouth 3 times daily as needed for Anxiety    Historical Provider, MD   cyclobenzaprine (FLEXERIL) 10 MG tablet Take 10 mg by mouth every evening    Historical Provider, MD   omeprazole (PRILOSEC) 20 MG capsule Take 20 mg by mouth Daily    Historical Provider, MD   citalopram (CELEXA) 40 MG tablet Take 40 mg by mouth daily    Historical Provider, MD       REVIEW OF SYSTEMS    (2-9 systems for level 4, 10 or more for level 5)      Review of Systems   Constitutional: Positive for diaphoresis and fatigue. Negative for chills and fever. HENT: Negative for congestion. Respiratory: Positive for chest tightness and shortness of breath. Cardiovascular: Positive for palpitations. Negative for chest pain and leg swelling. Gastrointestinal: Negative for abdominal pain, constipation, diarrhea, nausea and vomiting. Endocrine: Negative for polyuria. Genitourinary: Negative for difficulty urinating. Skin: Negative for color change. Neurological: Positive for speech difficulty. Negative for dizziness, facial asymmetry, weakness, light-headedness and headaches. Psychiatric/Behavioral: Negative for confusion. PHYSICAL EXAM   (up to 7 for level 4, 8 or more for level 5)      INITIAL VITALS:   BP (!) 151/100   Pulse 99   Temp 98.1 °F (36.7 °C) (Oral)   Resp 12   Ht 5' 7\" (1.702 m)   Wt 210 lb (95.3 kg)   SpO2 96%   BMI 32.89 kg/m²     Physical Exam  Constitutional:       Appearance: Normal appearance. HENT:      Head: Normocephalic and atraumatic. Mouth/Throat:      Mouth: Mucous membranes are moist.      Pharynx: Oropharynx is clear. Eyes:      General: No visual field deficit. Extraocular Movements: Extraocular movements intact. Conjunctiva/sclera: Conjunctivae normal.   Cardiovascular:      Rate and Rhythm: Normal rate and regular rhythm. Pulses: Normal pulses. Heart sounds: Normal heart sounds. No murmur heard. Pulmonary:      Effort: Pulmonary effort is normal.      Breath sounds: Normal breath sounds. Abdominal:      General: Bowel sounds are normal. There is no distension. Tenderness: There is no abdominal tenderness. There is no guarding. Musculoskeletal:         General: Normal range of motion. Comments: Range of motion noted to be normal with patient's natural movements   Skin:     General: Skin is warm and dry. Findings: No rash (On exposed skin). Neurological:      General: No focal deficit present. Mental Status: She is alert and oriented to person, place, and time. GCS: GCS eye subscore is 4. GCS verbal subscore is 5. GCS motor subscore is 6. Cranial Nerves: No dysarthria or facial asymmetry. Sensory: Sensation is intact. No sensory deficit. Motor: No weakness. Coordination: Romberg sign negative.  Heel to [facility] Test normal.      Gait: Gait normal. Comments: Patient did have difficulty finding words at a time but no aphasia noted.     Psychiatric:         Mood and Affect: Mood normal.         Behavior: Behavior normal.         DIFFERENTIAL  DIAGNOSIS     PLAN (LABS / IMAGING / EKG):  Orders Placed This Encounter   Procedures    COVID-19, Rapid    CT Head WO Contrast    CTA HEAD NECK W CONTRAST    XR CHEST PORTABLE    ELECTROLYTES PLUS    Hemoglobin and hematocrit, blood    CALCIUM, IONIC (POC)    CBC with Auto Differential    Basic Metabolic Panel w/ Reflex to MG    Troponin    Brain Natriuretic Peptide    Magnesium    Troponin    Troponin    Basic Metabolic Panel w/ Reflex to MG    CBC with Auto Differential    Troponin    Diet NPO    Vital signs per unit routine    Up as tolerated    Telemetry monitoring - 48 hour duration    Full Code    Initiate Oxygen Therapy Protocol    Venous Blood Gas, POC    Creatinine W/GFR Point of Care    POCT urea (BUN)    Lactic Acid, POC    POCT Glucose    EKG 12 Lead    Place in Observation Service       MEDICATIONS ORDERED:  Orders Placed This Encounter   Medications    0.9 % sodium chloride bolus    iopamidol (ISOVUE-370) 76 % injection 90 mL    potassium chloride (KLOR-CON M) extended release tablet 20 mEq    sodium chloride flush 0.9 % injection 5-40 mL    sodium chloride flush 0.9 % injection 5-40 mL    0.9 % sodium chloride infusion    enoxaparin (LOVENOX) injection 40 mg    OR Linked Order Group     ondansetron (ZOFRAN-ODT) disintegrating tablet 4 mg     ondansetron (ZOFRAN) injection 4 mg    polyethylene glycol (GLYCOLAX) packet 17 g    OR Linked Order Group     acetaminophen (TYLENOL) tablet 650 mg     acetaminophen (TYLENOL) suppository 650 mg    amLODIPine (NORVASC) tablet 10 mg    citalopram (CELEXA) tablet 40 mg    isosorbide mononitrate (IMDUR) extended release tablet 30 mg    lisinopril (PRINIVIL;ZESTRIL) tablet 5 mg    Meloxicam CAPS 5 mg    metoprolol tartrate (LOPRESSOR) tablet 25 mg    pantoprazole (PROTONIX) tablet 40 mg    atorvastatin (LIPITOR) tablet 40 mg       DIAGNOSTIC RESULTS / EMERGENCY DEPARTMENT COURSE / MDM   LAB RESULTS:  Results for orders placed or performed during the hospital encounter of 03/08/22   COVID-19, Rapid    Specimen: Nasopharyngeal Swab   Result Value Ref Range    Specimen Description . NASOPHARYNGEAL SWAB     SARS-CoV-2, Rapid Not Detected Not Detected   ELECTROLYTES PLUS   Result Value Ref Range    POC Sodium 141 138 - 146 mmol/L    POC Potassium 3.3 (L) 3.5 - 4.5 mmol/L    POC Chloride 102 98 - 107 mmol/L    POC TCO2 26 22 - 30 mmol/L    Anion Gap 14 7 - 16 mmol/L   Hemoglobin and hematocrit, blood   Result Value Ref Range    POC Hemoglobin 16.1 (H) 12.0 - 16.0 g/dL    POC Hematocrit 47 (H) 36 - 46 %   CALCIUM, IONIC (POC)   Result Value Ref Range    POC Ionized Calcium 1.19 1.15 - 1.33 mmol/L   CBC with Auto Differential   Result Value Ref Range    WBC 9.8 3.5 - 11.3 k/uL    RBC 4.79 3.95 - 5.11 m/uL    Hemoglobin 14.7 11.9 - 15.1 g/dL    Hematocrit 45.3 36.3 - 47.1 %    MCV 94.6 82.6 - 102.9 fL    MCH 30.7 25.2 - 33.5 pg    MCHC 32.5 28.4 - 34.8 g/dL    RDW 13.4 11.8 - 14.4 %    Platelets 913 727 - 483 k/uL    MPV 9.7 8.1 - 13.5 fL    NRBC Automated 0.0 0.0 per 100 WBC    Seg Neutrophils 83 (H) 36 - 65 %    Lymphocytes 8 (L) 24 - 43 %    Monocytes 9 3 - 12 %    Eosinophils % 0 (L) 1 - 4 %    Basophils 0 0 - 2 %    Immature Granulocytes 0 0 %    Segs Absolute 8.00 1.50 - 8.10 k/uL    Absolute Lymph # 0.80 (L) 1.10 - 3.70 k/uL    Absolute Mono # 0.84 0.10 - 1.20 k/uL    Absolute Eos # 0.03 0.00 - 0.44 k/uL    Basophils Absolute 0.04 0.00 - 0.20 k/uL    Absolute Immature Granulocyte 0.04 0.00 - 0.30 k/uL   Basic Metabolic Panel w/ Reflex to MG   Result Value Ref Range    Glucose 160 (H) 70 - 99 mg/dL    BUN 11 8 - 23 mg/dL    CREATININE 1.01 (H) 0.50 - 0.90 mg/dL    Calcium 9.8 8.6 - 10.4 mg/dL    Sodium 135 135 - 144 mmol/L    Potassium 3.4 (L) 3.7 - 5.3 mmol/L    Chloride 96 (L) 98 - 107 mmol/L    CO2 22 20 - 31 mmol/L    Anion Gap 17 9 - 17 mmol/L    GFR Non-African American 55 (L) >60 mL/min    GFR African American >60 >60 mL/min    GFR Comment         Troponin   Result Value Ref Range    Troponin, High Sensitivity 29 (H) 0 - 14 ng/L   Brain Natriuretic Peptide   Result Value Ref Range    Pro-BNP 31 <300 pg/mL   Magnesium   Result Value Ref Range    Magnesium 1.9 1.6 - 2.6 mg/dL   Troponin   Result Value Ref Range    Troponin, High Sensitivity 33 (H) 0 - 14 ng/L   Troponin   Result Value Ref Range    Troponin, High Sensitivity 26 (H) 0 - 14 ng/L   Venous Blood Gas, POC   Result Value Ref Range    pH, [place] 7.348 7.320 - 7.430    pCO2, [place] 46.1 41.0 - 51.0 mm Hg    pO2, [place] 19.5 (L) 30.0 - 50.0 mm Hg    HCO3, Venous 25.3 22.0 - 29.0 mmol/L    Negative Base Excess, [place] 1 0.0 - 2.0    O2 Sat, [place] 28 (L) 60.0 - 85.0 %   Creatinine W/GFR Point of Care   Result Value Ref Range    POC Creatinine 0.95 0.51 - 1.19 mg/dL    GFR Comment >60 >60 mL/min    GFR Non- 59 (L) >60 mL/min    GFR Comment         POCT urea (BUN)   Result Value Ref Range    POC BUN 10 8 - 26 mg/dL   Lactic Acid, POC   Result Value Ref Range    POC Lactic Acid 2.67 (H) 0.56 - 1.39 mmol/L   POCT Glucose   Result Value Ref Range    POC Glucose 158 (H) 74 - 100 mg/dL       RADIOLOGY:  CT Head WO Contrast   Final Result   1. No acute intracranial abnormality. Right maxillary sinus disease. 2. No flow-limiting arterial stenosis in the head and neck. 3. There is a 1.4 cm peripherally calcified hypodense nodule in the left   thyroid. CTA HEAD NECK W CONTRAST   Final Result   1. No acute intracranial abnormality. Right maxillary sinus disease. 2. No flow-limiting arterial stenosis in the head and neck. 3. There is a 1.4 cm peripherally calcified hypodense nodule in the left   thyroid. XR CHEST PORTABLE   Final Result   No acute process. EKG  EKG Interpretation    Interpreted by emergency department physician    Rhythm: sinus tachycardia  Rate: normal  Axis: normal  Ectopy: none  Conduction: normal  ST Segments: normal  T Waves: normal  Q Waves: none    Clinical Impression: non-specific EKG    Marcela Shen DO     All EKG's are interpreted by the Emergency Department Physician who either signs or Co-signs this chart in the absence of a cardiologist.    EMERGENCY DEPARTMENT COURSE:  ED Course as of 03/09/22 0125   Wed Mar 09, 2022   0100 Patient noted to have third troponin that is improving. [CR]      ED Course User Index  [CR] Dulce Medrano DO          PROCEDURES:  None    CONSULTS:  None    MEDICAL DECISION MAKING:  Patient presenting with concerns of acute onset of chest pain  With noted aphasia. Patient was nondiaphoretic on my evaluation but states she was diaphoretic, shortness of breath, and chest pain while at work. Patient received nitro and aspirin prior to arrival.  EKG demonstrated no concerns for ST elevation or ACS. Patient had troponins drawn that were noted to be slightly elevated, trended upward and then third troponin was trending downward. Concern for aphasia on arrival after patient received nitro, question whether this was secondary to blood pressure drop as patient had been using her blood pressure medications and most likely was hypertensive. Concern for TIA versus stroke was not high as patient had no neurological findings. CTA head neck and CT head were obtained urgently on arrival to ensure that no stroke findings were noted, scans revealed to be negative other than nodule in the thyroid, this was discussed with patient. Patient had no aphasia while here in the emergency department, did have a times where she struggled to find a word and stuttered. Patient's pain had improved after the nitro and aspirin.   Patient was admitted to the observation unit for work-up of chest pain, elevated troponins, and further evaluation. Cardiology evaluation deferred to admitting team as patient has a cardiologist at [facility], patient may need stress test or other evaluation tomorrow prior to cardiology evaluation and outpatient work-up. Patient also to be monitored overnight and observed overnight for any neurological changes and any aphasia, recommend a neuro exam repeat tomorrow to ensure no changes, may need neurologic consult/MRI if any changes or occurrences overnight. Plan to obtain labs and trend troponins in the a.m. Patient admitted to observation service for further evaluation work-up. CRITICAL CARE:  Please see attending note    FINAL IMPRESSION      1. Chest pain, unspecified type          DISPOSITION / [address] Admitted 03/08/2022 11:09:29 PM      PATIENT REFERRED TO:  No follow-up provider specified.     DISCHARGE MEDICATIONS:  Current Discharge Medication List          Marcela Esparza DO  Emergency Medicine Resident    (Please note that portions of thisnote were completed with a voice recognition program.  Efforts were made to edit the dictations but occasionally words are mis-transcribed.)        Armin Mcallister DO  Resident  03/09/22 9719

## 2022-03-09 NOTE — ED PROVIDER NOTES
Faculty Sign-Out Attestation  Handoff taken on the following patient from prior Attending Physician: Chilo Ballard    I was available and discussed any additional care issues that arose and coordinated the management plans with the resident(s) caring for the patient during my duty period. Any areas of disagreement with residents documentation of care or procedures are noted on the chart. I was personally present for the key portions of any/all procedures during my duty period. I have documented in the chart those procedures where I was not present during the key portions.     Aphasia / cp, // trop 33, needing admit    Kentrell Willoughby DO  Attending Physician     Kentrell Willoughby DO  03/08/22 5847    Admitted to obs per plan     Kentrell Willoughby DO  03/08/22 4782

## 2022-03-09 NOTE — ED NOTES
The following labs labeled with pt sticker and tubed to lab:     [] Blue     [] Lavender   [] on ice  [x] Green/yellow  [] Green/black [] on ice  [] Yellow  [] Red  [] Pink      [] COVID-19 swab    [] Rapid  [] PCR  [] Flu swab  [] Peds Viral Panel     [] Urine Sample  [] Pelvic Cultures  [] Blood Cultures            Caryl Diaz RN  03/09/22 3755

## 2022-03-09 NOTE — PROGRESS NOTES
Spoke with Aneesh De La O primary RN of patient concerning elevated troponin level and if Dr Ray Massey is OK with current levels without a cardiology consult for stress test. Aneesh De La O spoke with Dr Sheryle Mark is aware and ok to proceed with stress test.

## 2022-03-10 VITALS
HEIGHT: 67 IN | DIASTOLIC BLOOD PRESSURE: 94 MMHG | RESPIRATION RATE: 18 BRPM | SYSTOLIC BLOOD PRESSURE: 157 MMHG | WEIGHT: 210 LBS | BODY MASS INDEX: 32.96 KG/M2 | OXYGEN SATURATION: 96 % | HEART RATE: 82 BPM | TEMPERATURE: 97.7 F

## 2022-03-10 LAB
CHOLESTEROL/HDL RATIO: 6.7
CHOLESTEROL: 266 MG/DL
GLUCOSE BLD-MCNC: 122 MG/DL (ref 65–105)
GLUCOSE BLD-MCNC: 146 MG/DL (ref 65–105)
HDLC SERPL-MCNC: 40 MG/DL
LDL CHOLESTEROL: 179 MG/DL (ref 0–130)
LV EF: 60 %
LVEF MODALITY: NORMAL
TRIGL SERPL-MCNC: 235 MG/DL

## 2022-03-10 PROCEDURE — 2580000003 HC RX 258: Performed by: EMERGENCY MEDICINE

## 2022-03-10 PROCEDURE — 6370000000 HC RX 637 (ALT 250 FOR IP): Performed by: EMERGENCY MEDICINE

## 2022-03-10 PROCEDURE — 93306 TTE W/DOPPLER COMPLETE: CPT

## 2022-03-10 PROCEDURE — 82947 ASSAY GLUCOSE BLOOD QUANT: CPT

## 2022-03-10 PROCEDURE — G0378 HOSPITAL OBSERVATION PER HR: HCPCS

## 2022-03-10 PROCEDURE — 36415 COLL VENOUS BLD VENIPUNCTURE: CPT

## 2022-03-10 PROCEDURE — 96372 THER/PROPH/DIAG INJ SC/IM: CPT

## 2022-03-10 PROCEDURE — 6360000002 HC RX W HCPCS: Performed by: EMERGENCY MEDICINE

## 2022-03-10 PROCEDURE — 99225 PR SBSQ OBSERVATION CARE/DAY 25 MINUTES: CPT | Performed by: PSYCHIATRY & NEUROLOGY

## 2022-03-10 PROCEDURE — 6370000000 HC RX 637 (ALT 250 FOR IP): Performed by: STUDENT IN AN ORGANIZED HEALTH CARE EDUCATION/TRAINING PROGRAM

## 2022-03-10 PROCEDURE — 80061 LIPID PANEL: CPT

## 2022-03-10 RX ORDER — ATORVASTATIN CALCIUM 80 MG/1
80 TABLET, FILM COATED ORAL NIGHTLY
Status: DISCONTINUED | OUTPATIENT
Start: 2022-03-10 | End: 2022-03-10

## 2022-03-10 RX ORDER — ASPIRIN 81 MG/1
81 TABLET, CHEWABLE ORAL DAILY
Qty: 30 TABLET | Refills: 3 | Status: SHIPPED | OUTPATIENT
Start: 2022-03-11 | End: 2022-04-01

## 2022-03-10 RX ORDER — CLOPIDOGREL BISULFATE 75 MG/1
75 TABLET ORAL DAILY
Qty: 30 TABLET | Refills: 3 | Status: SHIPPED | OUTPATIENT
Start: 2022-03-11

## 2022-03-10 RX ORDER — ROSUVASTATIN CALCIUM 40 MG/1
40 TABLET, COATED ORAL NIGHTLY
Qty: 30 TABLET | Refills: 3 | Status: SHIPPED | OUTPATIENT
Start: 2022-03-10

## 2022-03-10 RX ORDER — ROSUVASTATIN CALCIUM 20 MG/1
40 TABLET, COATED ORAL NIGHTLY
Status: DISCONTINUED | OUTPATIENT
Start: 2022-03-10 | End: 2022-03-10 | Stop reason: HOSPADM

## 2022-03-10 RX ADMIN — SODIUM CHLORIDE, PRESERVATIVE FREE 10 ML: 5 INJECTION INTRAVENOUS at 08:35

## 2022-03-10 RX ADMIN — ACETAMINOPHEN 650 MG: 325 TABLET ORAL at 12:01

## 2022-03-10 RX ADMIN — CITALOPRAM 40 MG: 20 TABLET, FILM COATED ORAL at 08:34

## 2022-03-10 RX ADMIN — ENOXAPARIN SODIUM 40 MG: 100 INJECTION SUBCUTANEOUS at 08:34

## 2022-03-10 RX ADMIN — LISINOPRIL 5 MG: 5 TABLET ORAL at 08:34

## 2022-03-10 RX ADMIN — ASPIRIN 81 MG: 81 TABLET ORAL at 08:38

## 2022-03-10 RX ADMIN — AMLODIPINE BESYLATE 10 MG: 10 TABLET ORAL at 08:37

## 2022-03-10 RX ADMIN — PANTOPRAZOLE SODIUM 40 MG: 40 TABLET, DELAYED RELEASE ORAL at 08:34

## 2022-03-10 RX ADMIN — ISOSORBIDE MONONITRATE 30 MG: 30 TABLET ORAL at 08:34

## 2022-03-10 RX ADMIN — DESMOPRESSIN ACETATE 40 MG: 0.2 TABLET ORAL at 08:38

## 2022-03-10 RX ADMIN — METOPROLOL TARTRATE 25 MG: 25 TABLET ORAL at 08:40

## 2022-03-10 RX ADMIN — CLOPIDOGREL 75 MG: 75 TABLET, FILM COATED ORAL at 08:34

## 2022-03-10 ASSESSMENT — PAIN SCALES - GENERAL
PAINLEVEL_OUTOF10: 0
PAINLEVEL_OUTOF10: 0
PAINLEVEL_OUTOF10: 3

## 2022-03-10 NOTE — PROGRESS NOTES
CLINICAL PHARMACY NOTE: MEDS TO BEDS    Total # of Prescriptions Filled: 3   The following medications were delivered to the patient:  · plavix 75mg tablet  · Aspirin 81mg chewable tablet  · Rosuvastatin 40mg tablet    Additional Documentation: meds delivred to the pt in room 339 on 03.10.22 at 16:10, co pay $24.91 paid with a card on the [employer]over.

## 2022-03-10 NOTE — DISCHARGE SUMMARY
CDU Discharge Summary        Patient:  Anthony Viramontes  YOB: 1960    MRN: [id number]   Acct: [de-identified]    Primary Care Physician: Thai Darling MD    Admit date:  3/8/2022  8:18 PM  Discharge date: 3/10/2022  4:12 PM    Discharge Diagnoses:     1.) Transient aphasia - resolved, evaluated by Neurology, MRI neg for acute stroke, suspected TIA, chronic microvascular changes, started on antiplatelets followed by plavix crestor (hyperlipidemia on labs)  2.) Chest pain - evaluated by Cards, stress test complete with HTN only, otherwise stress and ECHO nml     Follow-up:  Call today/tomorrow for a follow up appointment with Thai Darling MD , or return to the Emergency Room with worsening symptoms    Stressed to patient the importance of following up with primary care doctor for further workup/management of symptoms. Pt verbalizes understanding and agrees with plan.     Discharge Medication Changes:       Medication List      START taking these medications    aspirin 81 MG chewable tablet  Take 1 tablet by mouth daily for 21 doses     clopidogrel 75 MG tablet  Commonly known as: PLAVIX  Take 1 tablet by mouth daily     rosuvastatin 40 MG tablet  Commonly known as: CRESTOR  Take 1 tablet by mouth nightly        CONTINUE taking these medications    albuterol sulfate  (90 Base) MCG/ACT inhaler     ALPRAZolam 0.5 MG tablet  Commonly known as: XANAX     amLODIPine 10 MG tablet  Commonly known as: NORVASC     citalopram 40 MG tablet  Commonly known as: CELEXA     cyclobenzaprine 10 MG tablet  Commonly known as: FLEXERIL     isosorbide mononitrate 30 MG extended release tablet  Commonly known as: IMDUR  Take 1 tablet by mouth daily     lisinopril 5 MG tablet  Commonly known as: PRINIVIL;ZESTRIL     Meloxicam 5 MG Caps     metoprolol tartrate 25 MG tablet  Commonly known as: LOPRESSOR  Take 1 tablet by mouth 2 times daily     omeprazole 20 MG delayed release capsule  Commonly known as: PRILOSEC STOP taking these medications    butalbital-acetaminophen-caffeine -40 MG per tablet  Commonly known as: FIORICET, ESGIC     nicotine 14 MG/24HR  Commonly known as: NICODERM CQ     simvastatin 40 MG tablet  Commonly known as: ZOCOR     triamterene-hydroCHLOROthiazide 37.5-25 MG per tablet  Commonly known as: MAXZIDE-25           Where to Get Your Medications      These medications were sent to [facility] [address] [facility], [address] [facility]  [address], [place] [address]    Phone: [phone]   · aspirin 81 MG chewable tablet  · clopidogrel 75 MG tablet  · rosuvastatin 40 MG tablet         Diet:  No diet orders on file, advance as tolerated     Activity:  As tolerated    Consultants: IP CONSULT TO NEUROLOGY    Procedures:  Not indicated     Diagnostic Test:   Results for orders placed or performed during the hospital encounter of 03/08/22   COVID-19, Rapid    Specimen: Nasopharyngeal Swab   Result Value Ref Range    Specimen Description . NASOPHARYNGEAL SWAB     SARS-CoV-2, Rapid Not Detected Not Detected   ELECTROLYTES PLUS   Result Value Ref Range    POC Sodium 141 138 - 146 mmol/L    POC Potassium 3.3 (L) 3.5 - 4.5 mmol/L    POC Chloride 102 98 - 107 mmol/L    POC TCO2 26 22 - 30 mmol/L    Anion Gap 14 7 - 16 mmol/L   Hemoglobin and hematocrit, blood   Result Value Ref Range    POC Hemoglobin 16.1 (H) 12.0 - 16.0 g/dL    POC Hematocrit 47 (H) 36 - 46 %   CALCIUM, IONIC (POC)   Result Value Ref Range    POC Ionized Calcium 1.19 1.15 - 1.33 mmol/L   CBC with Auto Differential   Result Value Ref Range    WBC 9.8 3.5 - 11.3 k/uL    RBC 4.79 3.95 - 5.11 m/uL    Hemoglobin 14.7 11.9 - 15.1 g/dL    Hematocrit 45.3 36.3 - 47.1 %    MCV 94.6 82.6 - 102.9 fL    MCH 30.7 25.2 - 33.5 pg    MCHC 32.5 28.4 - 34.8 g/dL    RDW 13.4 11.8 - 14.4 %    Platelets 724 742 - 322 k/uL    MPV 9.7 8.1 - 13.5 fL    NRBC Automated 0.0 0.0 per 100 WBC    Seg Neutrophils 83 (H) 36 - 65 % Lymphocytes 8 (L) 24 - 43 %    Monocytes 9 3 - 12 %    Eosinophils % 0 (L) 1 - 4 %    Basophils 0 0 - 2 %    Immature Granulocytes 0 0 %    Segs Absolute 8.00 1.50 - 8.10 k/uL    Absolute Lymph # 0.80 (L) 1.10 - 3.70 k/uL    Absolute Mono # 0.84 0.10 - 1.20 k/uL    Absolute Eos # 0.03 0.00 - 0.44 k/uL    Basophils Absolute 0.04 0.00 - 0.20 k/uL    Absolute Immature Granulocyte 0.04 0.00 - 0.30 k/uL   Basic Metabolic Panel w/ Reflex to MG   Result Value Ref Range    Glucose 160 (H) 70 - 99 mg/dL    BUN 11 8 - 23 mg/dL    CREATININE 1.01 (H) 0.50 - 0.90 mg/dL    Calcium 9.8 8.6 - 10.4 mg/dL    Sodium 135 135 - 144 mmol/L    Potassium 3.4 (L) 3.7 - 5.3 mmol/L    Chloride 96 (L) 98 - 107 mmol/L    CO2 22 20 - 31 mmol/L    Anion Gap 17 9 - 17 mmol/L    GFR Non-African American 55 (L) >60 mL/min    GFR African American >60 >60 mL/min    GFR Comment         Troponin   Result Value Ref Range    Troponin, High Sensitivity 29 (H) 0 - 14 ng/L   Brain Natriuretic Peptide   Result Value Ref Range    Pro-BNP 31 <300 pg/mL   Magnesium   Result Value Ref Range    Magnesium 1.9 1.6 - 2.6 mg/dL   Troponin   Result Value Ref Range    Troponin, High Sensitivity 33 (H) 0 - 14 ng/L   Troponin   Result Value Ref Range    Troponin, High Sensitivity 26 (H) 0 - 14 ng/L   Basic Metabolic Panel w/ Reflex to MG   Result Value Ref Range    Glucose 137 (H) 70 - 99 mg/dL    BUN 9 8 - 23 mg/dL    CREATININE 0.80 0.50 - 0.90 mg/dL    Calcium 9.5 8.6 - 10.4 mg/dL    Sodium 132 (L) 135 - 144 mmol/L    Potassium 4.0 3.7 - 5.3 mmol/L    Chloride 98 98 - 107 mmol/L    CO2 21 20 - 31 mmol/L    Anion Gap 13 9 - 17 mmol/L    GFR Non-African American >60 >60 mL/min    GFR African American >60 >60 mL/min    GFR Comment         CBC with Auto Differential   Result Value Ref Range    WBC 5.5 3.5 - 11.3 k/uL    RBC 4.43 3.95 - 5.11 m/uL    Hemoglobin 13.7 11.9 - 15.1 g/dL    Hematocrit 41.3 36.3 - 47.1 %    MCV 93.2 82.6 - 102.9 fL    MCH 30.9 25.2 - 33.5 pg MCHC 33.2 28.4 - 34.8 g/dL    RDW 13.7 11.8 - 14.4 %    Platelets 583 285 - 942 k/uL    MPV 10.0 8.1 - 13.5 fL    NRBC Automated 0.0 0.0 per 100 WBC    Immature Granulocytes 0 0 %    Seg Neutrophils 73 (H) 36 - 65 %    Lymphocytes 12 (L) 24 - 43 %    Monocytes 13 (H) 3 - 12 %    Eosinophils % 1 1 - 4 %    Basophils 1 0 - 2 %    Absolute Immature Granulocyte 0.00 0.00 - 0.30 k/uL    Segs Absolute 4.00 1.50 - 8.10 k/uL    Absolute Lymph # 0.66 (L) 1.10 - 3.70 k/uL    Absolute Mono # 0.72 0.10 - 1.20 k/uL    Absolute Eos # 0.06 0.00 - 0.44 k/uL    Basophils Absolute 0.06 0.00 - 0.20 k/uL    Morphology Normal    Troponin   Result Value Ref Range    Troponin, High Sensitivity 22 (H) 0 - 14 ng/L   Hemoglobin A1C   Result Value Ref Range    Hemoglobin A1C 7.3 (H) 4.0 - 6.0 %    Estimated Avg Glucose 163 mg/dL   TSH   Result Value Ref Range    TSH 1.32 0.30 - 5.00 mIU/L   LIPID PANEL   Result Value Ref Range    Cholesterol 266 (H) <200 mg/dL    HDL 40 (L) >40 mg/dL    LDL Cholesterol 179 (H) 0 - 130 mg/dL    Chol/HDL Ratio 6.7 (H) <5    Triglycerides 235 (H) <150 mg/dL   Venous Blood Gas, POC   Result Value Ref Range    pH, [place] 7.348 7.320 - 7.430    pCO2, [place] 46.1 41.0 - 51.0 mm Hg    pO2, [place] 19.5 (L) 30.0 - 50.0 mm Hg    HCO3, Venous 25.3 22.0 - 29.0 mmol/L    Negative Base Excess, [place]io 1 0.0 - 2.0    O2 Sat, [place] 28 (L) 60.0 - 85.0 %   Creatinine W/GFR Point of Care   Result Value Ref Range    POC Creatinine 0.95 0.51 - 1.19 mg/dL    GFR Comment >60 >60 mL/min    GFR Non- 59 (L) >60 mL/min    GFR Comment         POCT urea (BUN)   Result Value Ref Range    POC BUN 10 8 - 26 mg/dL   Lactic Acid, POC   Result Value Ref Range    POC Lactic Acid 2.67 (H) 0.56 - 1.39 mmol/L   POCT Glucose   Result Value Ref Range    POC Glucose 158 (H) 74 - 100 mg/dL   POC Glucose Fingerstick   Result Value Ref Range    POC Glucose 136 (H) 65 - 105 mg/dL   POC Glucose Fingerstick   Result Value Ref Range    POC Glucose 128 (H) 65 - 105 mg/dL   POC Glucose Fingerstick   Result Value Ref Range    POC Glucose 141 (H) 65 - 105 mg/dL   POC Glucose Fingerstick   Result Value Ref Range    POC Glucose 118 (H) 65 - 105 mg/dL   POC Glucose Fingerstick   Result Value Ref Range    POC Glucose 169 (H) 65 - 105 mg/dL   POC Glucose Fingerstick   Result Value Ref Range    POC Glucose 146 (H) 65 - 105 mg/dL   POC Glucose Fingerstick   Result Value Ref Range    POC Glucose 122 (H) 65 - 105 mg/dL   EKG 12 Lead   Result Value Ref Range    Ventricular Rate 116 BPM    Atrial Rate 116 BPM    P-R Interval 134 ms    QRS Duration 86 ms    Q-T Interval 328 ms    QTc Calculation (Bazett) 455 ms    P Axis 48 degrees    R Axis 51 degrees    T Axis 54 degrees   EKG 12 Lead   Result Value Ref Range    Ventricular Rate 83 BPM    Atrial Rate 83 BPM    P-R Interval 140 ms    QRS Duration 86 ms    Q-T Interval 380 ms    QTc Calculation (Bazett) 446 ms    P Axis 13 degrees    R Axis 31 degrees    T Axis 73 degrees     CT Head WO Contrast    Result Date: 3/8/2022  EXAMINATION: CTA OF THE HEAD AND NECK WITH CONTRAST; CT OF THE HEAD WITHOUT CONTRAST 3/8/2022 8:55 pm: TECHNIQUE: CTA of the head and neck was performed with the administration of intravenous contrast. Multiplanar reformatted images are provided for review. MIP images are provided for review. Stenosis of the internal carotid arteries measured using NASCET criteria. Dose modulation, iterative reconstruction, and/or weight based adjustment of the mA/kV was utilized to reduce the radiation dose to as low as reasonably achievable.; CT of the head was performed without the administration of intravenous contrast. Dose modulation, iterative reconstruction, and/or weight based adjustment of the mA/kV was utilized to reduce the radiation dose to as low as reasonably achievable. Noncontrast CT of the head with reconstructed 2-D images are also provided for review. COMPARISON: None.  HISTORY: ORDERING SYSTEM PROVIDED HISTORY: aphasia post nitro FINDINGS: CT HEAD: BRAIN/VENTRICLES:  No acute intracranial hemorrhage or extraaxial fluid collection. Grey-white differentiation is maintained. No evidence of mass, mass effect or midline shift. No evidence of hydrocephalus. ORBITS: The visualized portion of the orbits demonstrate no acute abnormality. SINUSES:  Near complete opacification of right maxillary sinus. Remainder of the visualized paranasal sinuses and mastoid air cells demonstrate no acute abnormality. SOFT TISSUES/SKULL: No acute abnormality of the visualized skull or soft tissues. CTA NECK: AORTIC ARCH/ARCH VESSELS: No dissection or arterial injury. No significant stenosis of the brachiocephalic or subclavian arteries. CAROTID ARTERIES: No dissection, arterial injury, or hemodynamically significant stenosis by NASCET criteria. VERTEBRAL ARTERIES: No dissection, arterial injury, or significant stenosis. SOFT TISSUES: The lung apices are clear. No cervical or superior mediastinal lymphadenopathy. The larynx and pharynx are unremarkable. There is a 1.4 cm peripherally calcified hypodense nodule in the left thyroid. BONES: No acute osseous abnormality. CTA HEAD: ANTERIOR CIRCULATION: No significant stenosis of the intracranial internal carotid, anterior cerebral, or middle cerebral arteries. No aneurysm. POSTERIOR CIRCULATION: No significant stenosis of the vertebral, basilar, or posterior cerebral arteries. No aneurysm. OTHER: No dural venous sinus thrombosis on this non-dedicated study. 1. No acute intracranial abnormality. Right maxillary sinus disease. 2. No flow-limiting arterial stenosis in the head and neck. 3. There is a 1.4 cm peripherally calcified hypodense nodule in the left thyroid.      XR CHEST PORTABLE    Result Date: 3/8/2022  EXAMINATION: ONE XRAY VIEW OF THE CHEST 3/8/2022 5:39 pm COMPARISON: 06/02/2016 HISTORY: ORDERING SYSTEM PROVIDED HISTORY: eval for chest pain TECHNOLOGIST PROVIDED HISTORY: eval for chest pain Reason for Exam: upr FINDINGS: The lungs are without acute focal process. There is no effusion or pneumothorax. The cardiomediastinal silhouette is stable. The osseous structures are stable. No acute process. CTA HEAD NECK W CONTRAST    Result Date: 3/8/2022  EXAMINATION: CTA OF THE HEAD AND NECK WITH CONTRAST; CT OF THE HEAD WITHOUT CONTRAST 3/8/2022 8:55 pm: TECHNIQUE: CTA of the head and neck was performed with the administration of intravenous contrast. Multiplanar reformatted images are provided for review. MIP images are provided for review. Stenosis of the internal carotid arteries measured using NASCET criteria. Dose modulation, iterative reconstruction, and/or weight based adjustment of the mA/kV was utilized to reduce the radiation dose to as low as reasonably achievable.; CT of the head was performed without the administration of intravenous contrast. Dose modulation, iterative reconstruction, and/or weight based adjustment of the mA/kV was utilized to reduce the radiation dose to as low as reasonably achievable. Noncontrast CT of the head with reconstructed 2-D images are also provided for review. COMPARISON: None. HISTORY: ORDERING SYSTEM PROVIDED HISTORY: aphasia post nitro FINDINGS: CT HEAD: BRAIN/VENTRICLES:  No acute intracranial hemorrhage or extraaxial fluid collection. Grey-white differentiation is maintained. No evidence of mass, mass effect or midline shift. No evidence of hydrocephalus. ORBITS: The visualized portion of the orbits demonstrate no acute abnormality. SINUSES:  Near complete opacification of right maxillary sinus. Remainder of the visualized paranasal sinuses and mastoid air cells demonstrate no acute abnormality. SOFT TISSUES/SKULL: No acute abnormality of the visualized skull or soft tissues. CTA NECK: AORTIC ARCH/ARCH VESSELS: No dissection or arterial injury. No significant stenosis of the brachiocephalic or subclavian arteries.  CAROTID ARTERIES: No dissection, arterial injury, or hemodynamically significant stenosis by NASCET criteria. VERTEBRAL ARTERIES: No dissection, arterial injury, or significant stenosis. SOFT TISSUES: The lung apices are clear. No cervical or superior mediastinal lymphadenopathy. The larynx and pharynx are unremarkable. There is a 1.4 cm peripherally calcified hypodense nodule in the left thyroid. BONES: No acute osseous abnormality. CTA HEAD: ANTERIOR CIRCULATION: No significant stenosis of the intracranial internal carotid, anterior cerebral, or middle cerebral arteries. No aneurysm. POSTERIOR CIRCULATION: No significant stenosis of the vertebral, basilar, or posterior cerebral arteries. No aneurysm. OTHER: No dural venous sinus thrombosis on this non-dedicated study. 1. No acute intracranial abnormality. Right maxillary sinus disease. 2. No flow-limiting arterial stenosis in the head and neck. 3. There is a 1.4 cm peripherally calcified hypodense nodule in the left thyroid. MRI BRAIN WO CONTRAST    Result Date: 3/9/2022  EXAMINATION: MRI OF THE BRAIN WITHOUT CONTRAST  3/9/2022 5:46 pm TECHNIQUE: Multiplanar multisequence MRI of the brain was performed without the administration of intravenous contrast. COMPARISON: None. HISTORY: ORDERING SYSTEM PROVIDED HISTORY: ischemic stroke TECHNOLOGIST PROVIDED HISTORY: ischemic stroke Reason for Exam: ischemic stroke FINDINGS: INTRACRANIAL STRUCTURES/VENTRICLES: No acute infarct or mass. Mild chronic white matter microvascular ischemic changes. No mass effect or midline shift. No evidence of an acute intracranial hemorrhage. The ventricles and sulci are normal in size and configuration. The sellar/suprasellar regions appear unremarkable. The normal signal voids within the major intracranial vessels appear maintained. ORBITS: Orbital structures are unremarkable. SINUSES: Near complete opacification of the right maxillary sinus. No mastoid effusion.  BONES/SOFT TISSUES: The bone marrow signal intensity appears normal. The soft tissues demonstrate no acute abnormality. 1. No acute intracranial abnormality. 2. Mild chronic white matter microvascular ischemic changes. 3. Near complete opacification of the right maxillary sinus may reflect sinusitis. NM Cardiac Stress Test Nuclear Imaging    Result Date: 3/9/2022  EXAMINATION: MYOCARDIAL PERFUSION IMAGING 3/9/2022 9:32 am TECHNIQUE: Rest dose:  13 mCi Tc-99m sestamibi intravenously Stress dose:  42 mCi Tc-99m sestamibi intravenously Under cardiology supervision, 0.4 mg Lexiscan was infused intravenously prior to injection of the stress dose. SPECT imaging was acquired following injection of the sestamibi. ECG gating was obtained following the stress acquisition. COMPARISON: None Available. HISTORY: ORDERING SYSTEM PROVIDED HISTORY: Angina TECHNOLOGIST PROVIDED HISTORY: Reason for Exam: Angina Procedure Type->Rx r/o acs Reason for Exam: Chest pain, palpitations, SOB, unusual sweating, prior cardiac Cath 2016, high BP,diabetic, high cholesterol,prior smoker and family Hx of CAD. 77-year-old female with chest pain, angina, palpitations, shortness of breath FINDINGS: The patient achieved a maximum heart rate of 108 beats per minute, 69 % of the maximum age predicted heart rate of 156 beats per minute. Perfusion: There is no scintigraphic evidence for a reversible or fixed perfusion defect to suggest reversible ischemia or infarct. Function: The gated SPECT data demonstrates normal left ventricular size and normal wall motion. Left ventricular ejection fraction:  63% TID score:  1.26 (threshold value of 1.39 is used for Lexiscan stress with Tc-99m). There is no stress-induced cavitary dilatation to suggest compensated triple vessel disease. End diastolic volume:  93AL Scores are visually adjusted to account for potential artifact. Summed stress score:  0 Summed rest score:  0 Summed reversibility score:  0     1.  No definitive scintigraphic evidence for reversible ischemia or infarct 2. Left ventricular ejection fraction of 63%. 3.  Please see report for EKG portion of the examination which will be performed separately by physician from cardiology. Risk stratification:  Low risk Note:  Risk stratification incorporates both clinical history and test results. Final risk determination is the responsibility of the ordering provider as history and other test results may increase or decrease the risk stratification reported for this examination. Risk stratification criteria are adapted from \"Noninvasive Risk Stratification\" criteria from Pulcarlos Ferrer. Al, ACC/AATS/AHA/ASE/ASNC/SCAI/SCCT/STS 2017 Appropriate Use Criteria For Coronary Revascularization in Patients With Stable Ischemic Heart Disease [facility] Volume 69, Issue 17, May 2017 High risk (>3% annual death or MI) 1. Severe resting LV dysfunction (LVEF >35%) not readily explained by non coronary causes 2. Resting perfusion abnormalities greater than 10% of the myocardium in patients without prior history or evidence of MI 3. Stress-induced perfusion abnormalities encumbering greater than or equal to 10% myocardium or stress segmental scores indicating multiple vascular territories with abnormalities 4. Stress-induced LV dilatation (TID ratio greater than 1.19 for exercise and greater than 1.39 for regadenoson) Intermediate risk (1% to 3% annual death or MI) 1. Mild/moderate resting LV dysfunction (LVEF 35% to 49%) not readily explained by non coronary causes. 2.  Resting perfusion abnormalities in 5%-9.9% of the myocardium in patients without a history or prior evidence of MI 3. Stress-induced perfusion abnormality encumbering 5%-9.9% of the myocardium or stress segmental scores indicating 1 vascular territory with abnormalities but without LV dilation 4. Small wall motion abnormality involving 1-2 segments and only 1 coronary bed. Low Risk (Less than 1% annual death or MI) 1. Normal or small myocardial perfusion defect at rest or with stress encumbering less than 5% of the myocardium. This examination was read in conjunction with the cardiology fellow, Dr. Chiara Smith: Unavailable           Physical Exam:    General appearance - NAD, AOx 3   Lungs -CTAB, no R/R/R  Heart - RRR, no M/R/G  Abdomen - Soft, NT/ND  Neurological:  MAEx4, No focal motor deficit, sensory loss, back to baseline, clear speech  Extremities - Cap refil <2 sec in all ext., no edema  Skin -warm, dry      Hospital Course:  Clinical course has improved, labs and imaging reviewed. Chelo Gamez originally presented to the hospital on 3/8/2022  8:18 PM with aphasia and trouble word finding, associated with chest pain and diaphoresis. At that time it was determined that She required further observation and evaluation with brain MRI, stress, and ECHO. Labs and imaging were followed daily. Imaging results as above. She is medically stable to be discharged. Symptoms have resolved. Disposition: Home    Patient stated that they will not drive themselves home from the hospital if they have gotten pain killers/ narcotics earlier that day and that they will arrange for transportation on their own or work with the  for a ride. Patient counseled NOT to drive while under the influence of narcotics/ pain killers. Condition: Good    Patient stable and ready for discharge home. I have discussed plan of care with patient and they are in understanding. They were instructed to read discharge paperwork. All of their questions and concerns were addressed. Time Spent: 0 day      --  Alycia Holt MD  Emergency Medicine Attending Physician    This dictation was generated by voice recognition computer software. Although all attempts are made to edit the dictation for accuracy, there may be errors in the transcription that are not intended.

## 2022-03-10 NOTE — FLOWSHEET NOTE
Assessment: Patient was awake and oriented when  visited. Family was not present. Patient remained hopeful and seemed to appreciate the treatment and support she was receiving. When asked how she was feeling, patient responded; \"better. \" Patient said she was raised Jewish.   Intervention:  offered support and prayed with patient. Outcome: patient expressed appreciation for the prayer said with her. Plan: Follow up visits recommended for more spiritual and emotional support.        03/10/22 1200   Encounter Summary   Services provided to: Patient   Support System Family members   Place of [address] Visiting   (03/10/2022)   Complexity of Encounter Moderate   Length of Encounter 15 minutes   Spiritual Assessment Completed Yes   Routine   Type Initial   Assessment Calm; Approachable; Hopeful   Intervention Active listening;Nurtured hope;Prayer;[place];Empowerment   Outcome Expressed gratitude   Spiritual/Confucianism   Type Spiritual support

## 2022-03-10 NOTE — PROGRESS NOTES
OBS/CDU   RESIDENT NOTE      Patients PCP is:  Kiera Mari MD        SUBJECTIVE      No acute events overnight. Did stress test today which was uneventful other than blood pressure up to 178/115 and PVCs. Report with left ventricular EF 63% no evidence of reversible ischemia or infarct. Also completed MRI brain for aphasia initial presentation, no acute abnormalities, mild chronic microvascular ischemic changes and right maxillary sinus opacification. Hypertension 157/94 this morning, vitals otherwise stable overnight. No new lab results other than lipid panel with cholesterol to 266, cholesterol to HDL ratio 6.7, triglyceride 235. PHYSICAL EXAM      General: NAD, AO X 3  Heent: EMOI, PERRL  Neck: SUPPLE, NO JVD  Cardiovascular: RRR, S1S2  Pulmonary: CTAB, NO SOB  Abdomen: SOFT, NTTP, ND, +BS  Extremities: +2/4 PULSES DISTAL, NO SWELLING  Neuro / Psych: NO NUMBNESS OR TINGLING, MENTATION AT BASELINE    PERTINENT TEST /EXAMS      I have reviewed all available laboratory results.     MEDICATIONS CURRENT   rosuvastatin (CRESTOR) tablet 40 mg, Nightly  amLODIPine (NORVASC) tablet 10 mg, Daily  citalopram (CELEXA) tablet 40 mg, Daily  isosorbide mononitrate (IMDUR) extended release tablet 30 mg, Daily  lisinopril (PRINIVIL;ZESTRIL) tablet 5 mg, Daily  Meloxicam CAPS 5 mg, Daily  metoprolol tartrate (LOPRESSOR) tablet 25 mg, BID  pantoprazole (PROTONIX) tablet 40 mg, QAM AC  sodium chloride flush 0.9 % injection 10 mL, PRN  sodium chloride flush 0.9 % injection 10 mL, PRN  clopidogrel (PLAVIX) tablet 75 mg, Daily  aspirin chewable tablet 81 mg, Daily  ibuprofen (ADVIL;MOTRIN) tablet 600 mg, Q6H PRN  sodium chloride flush 0.9 % injection 5-40 mL, 2 times per day  sodium chloride flush 0.9 % injection 5-40 mL, PRN  0.9 % sodium chloride infusion, PRN  enoxaparin (LOVENOX) injection 40 mg, Daily  ondansetron (ZOFRAN-ODT) disintegrating tablet 4 mg, Q8H PRN   Or  ondansetron (ZOFRAN) injection 4 mg, Q6H PRN  polyethylene glycol (GLYCOLAX) packet 17 g, Daily PRN  acetaminophen (TYLENOL) tablet 650 mg, Q6H PRN   Or  acetaminophen (TYLENOL) suppository 650 mg, Q6H PRN        All medication charted and reviewed. CONSULTS      IP CONSULT TO NEUROLOGY    ASSESSMENT/PLAN        Trace Martinez is a 57 y.o. female who presented with chest pain and aphasia. Cardiology eval completed stress and ECHO (pending). Neurology suspects TIA given symptoms and MRI findings, rec start plavix and asa. Feels good this afternoon, ready to go home.      · F/u ECHO, provisional D/C pending results  · D/c on ASA and Plavix per Neuro  · Continue home meds, pain control   · Monitor vitals, labs, imaging   · Significant hyperlipidemia - on statin  · COPD mild and stable - on home albuterol only, will discuss starting inhaled steroid with her pcp next week  · Diet / IVF: Reg  · Tubes / lines / drains: PIV    DISPO: home this afternoon pending ECHO results    --  Sebastián Reich MD  Emergency Medicine Resident

## 2022-03-10 NOTE — PLAN OF CARE
Problem: Falls - Risk of:  Goal: Will remain free from falls  Description: Will remain free from falls  3/10/2022 1101 by Melissa Haider RN  Outcome: Ongoing  3/9/2022 2153 by Ella Mazariegos RN  Outcome: Ongoing  Goal: Absence of physical injury  Description: Absence of physical injury  3/10/2022 1101 by Melissa Haider RN  Outcome: Ongoing  3/9/2022 2153 by Ella Mazariegos RN  Outcome: Ongoing     Problem: Infection:  Goal: Will remain free from infection  Description: Will remain free from infection  3/10/2022 1101 by Melissa Haider RN  Outcome: Ongoing  3/9/2022 2153 by Ella Mazariegos RN  Outcome: Ongoing     Problem: Safety:  Goal: Free from accidental physical injury  Description: Free from accidental physical injury  3/10/2022 1101 by Melissa Haider RN  Outcome: Ongoing  3/9/2022 2153 by Ella Mazariegos RN  Outcome: Ongoing  Goal: Free from intentional harm  Description: Free from intentional harm  3/10/2022 1101 by Melissa Haider RN  Outcome: Ongoing  3/9/2022 2153 by Ella Mazariegos RN  Outcome: Ongoing     Problem: Daily Care:  Goal: Daily care needs are met  Description: Daily care needs are met  3/10/2022 1101 by Melissa Haider RN  Outcome: Ongoing  3/9/2022 2153 by Ella Mazariegos RN  Outcome: Ongoing     Problem: Pain:  Description: Pain management should include both nonpharmacologic and pharmacologic interventions.   Goal: Patient's pain/discomfort is manageable  Description: Patient's pain/discomfort is manageable  3/10/2022 1101 by Melissa Haider RN  Outcome: Ongoing  3/9/2022 2153 by Ella Mazariegos RN  Outcome: Ongoing  Goal: Pain level will decrease  Description: Pain level will decrease  3/10/2022 1101 by Melissa Haider RN  Outcome: Ongoing  3/9/2022 2153 by Ella Mazariegos RN  Outcome: Ongoing  Goal: Control of acute pain  Description: Control of acute pain  3/10/2022 1101 by Melissa Haider RN  Outcome: Ongoing  3/9/2022 2153 by Ella Mazariegos RN  Outcome: Ongoing  Goal: Control of chronic pain  Description: Control of chronic pain  3/10/2022 1101 by Godfrey Serrano RN  Outcome: Ongoing  3/9/2022 2153 by Jeannette Cid RN  Outcome: Ongoing     Problem: Skin Integrity:  Goal: Skin integrity will stabilize  Description: Skin integrity will stabilize  3/10/2022 1101 by Godfrey Serrano RN  Outcome: Ongoing  3/9/2022 2153 by Jeannette Cid RN  Outcome: Ongoing     Problem: Discharge Planning:  Goal: Patients continuum of care needs are met  Description: Patients continuum of care needs are met  3/10/2022 1101 by Godfrey Serrano RN  Outcome: Ongoing  3/9/2022 2153 by Jeannette Cid RN  Outcome: Ongoing

## 2022-03-10 NOTE — PROGRESS NOTES
[address] [facility]  CDU / OBSERVATION ENCOUNTER  ATTENDING NOTE       I performed a history and physical examination of the patient and discussed management with the resident or midlevel provider. I reviewed the resident or midlevel provider's note and agree with the documented findings and plan of care. Any areas of disagreement are noted on the chart. I was personally present for the key portions of any procedures. I have documented in the chart those procedures where I was not present during the key portions. I have reviewed the nurses notes. I agree with the chief complaint, past medical history, past surgical history, allergies, medications, social and family history as documented unless otherwise noted below. The Family history, social history, and ROS are effectively unchanged since admission unless noted elsewhere in the chart. Patient with complaints of aphasia prompting her to come to the ER. Patient had associated chest pain and diaphoresis. Patient had trouble with word finding. Patient stated that she was improved when she arrived. Patient had symptoms very consistent with anginal type chest discomfort. Patient for evaluation by cardiology and by neurology. MRI ordered. Patient for stress testing. Patient reasonably asymptomatic now. Patient nearly resolved in terms of her speech. Patient does have some chest discomfort but really minimal.    Patient will need testing completed. Patient for reevaluation after testing.     Ashley Theodore MD  Attending Emergency  Physician

## 2022-03-10 NOTE — PLAN OF CARE
Problem: Falls - Risk of:  Goal: Will remain free from falls  Description: Will remain free from falls  3/10/2022 1501 by Ross Arnold RN  Outcome: Completed  3/10/2022 1101 by Ross Arnold RN  Outcome: Ongoing  Goal: Absence of physical injury  Description: Absence of physical injury  3/10/2022 1501 by Ross Arnold RN  Outcome: Completed  3/10/2022 1101 by Ross Arnold RN  Outcome: Ongoing     Problem: Infection:  Goal: Will remain free from infection  Description: Will remain free from infection  3/10/2022 1501 by Ross Arnold RN  Outcome: Completed  3/10/2022 1101 by Ross Arnold RN  Outcome: Ongoing     Problem: Safety:  Goal: Free from accidental physical injury  Description: Free from accidental physical injury  3/10/2022 1501 by Ross Arnold RN  Outcome: Completed  3/10/2022 1101 by oRss Arnold RN  Outcome: Ongoing  Goal: Free from intentional harm  Description: Free from intentional harm  3/10/2022 1501 by Ross Arnold RN  Outcome: Completed  3/10/2022 1101 by Ross Arnold RN  Outcome: Ongoing     Problem: Daily Care:  Goal: Daily care needs are met  Description: Daily care needs are met  3/10/2022 1501 by Ross Arnold RN  Outcome: Completed  3/10/2022 1101 by Ross Arnold RN  Outcome: Ongoing     Problem: Pain:  Description: Pain management should include both nonpharmacologic and pharmacologic interventions.   Goal: Patient's pain/discomfort is manageable  Description: Patient's pain/discomfort is manageable  3/10/2022 1501 by Ross Arnold RN  Outcome: Completed  3/10/2022 1101 by Ross Arnold RN  Outcome: Ongoing  Goal: Pain level will decrease  Description: Pain level will decrease  3/10/2022 1501 by Ross Arnold RN  Outcome: Completed  3/10/2022 1101 by Ross Arnold RN  Outcome: Ongoing  Goal: Control of acute pain  Description: Control of acute pain  3/10/2022 1501 by Ross Arnold RN  Outcome: Completed  3/10/2022 1101 by [address] [place] [address] OMAR Arriaga  Outcome: Ongoing  Goal: Control of chronic pain  Description: Control of chronic pain  3/10/2022 1501 by Kerri Scales RN  Outcome: Completed  3/10/2022 1101 by Kerri Scales RN  Outcome: Ongoing     Problem: Skin Integrity:  Goal: Skin integrity will stabilize  Description: Skin integrity will stabilize  3/10/2022 1501 by Kerri Scales RN  Outcome: Completed  3/10/2022 1101 by Kerri Scales RN  Outcome: Ongoing     Problem: Discharge Planning:  Goal: Patients continuum of care needs are met  Description: Patients continuum of care needs are met  3/10/2022 1501 by Kerri Scales RN  Outcome: Completed  3/10/2022 1101 by Kerri Scales RN  Outcome: Ongoing     Problem: Skin Integrity:  Goal: Skin integrity will stabilize  Description: Skin integrity will stabilize  3/10/2022 1501 by Kerri Scales RN  Outcome: Completed  3/10/2022 1101 by Kerri Scales RN  Outcome: Ongoing

## 2022-03-10 NOTE — PROGRESS NOTES
[address] [facility]  CDU / OBSERVATION ENCOUNTER  ATTENDING NOTE       I performed a history and physical examination of the patient and discussed management with the resident or midlevel provider. I reviewed the resident or midlevel provider's note and agree with the documented findings and plan of care. Any areas of disagreement are noted on the chart. I was personally present for the key portions of any procedures. I have documented in the chart those procedures where I was not present during the key portions. I have reviewed the nurses notes. I agree with the chief complaint, past medical history, past surgical history, allergies, medications, social and family history as documented unless otherwise noted below. The Family history, social history, and ROS are effectively unchanged since admission unless noted elsewhere in the chart. Patient felt well today on reevaluation. Patient is in good condition and is stating she is ready for discharge. Patient has family member who states she is at her normal.  Will follow neurology recommendations regarding antiplatelet therapy. Appreciate cardiology involvement. Testing negative.     Sundar Herrera MD  Attending Emergency  Physician

## 2022-03-10 NOTE — PROGRESS NOTES
[facility]   [address] [place] [address] Fran    Progress note             Date:   3/10/2022  Patient name:  Octaviano Chino  Date of admission:  3/8/2022  8:18 PM  MRN:   [id number]  Account:  [de-identified]  YOB: 1960  PCP:    Viktoria Barrera MD  Room:   [address] [place] [address]  Code Status:    Full Code    Chief Complaint:     Chief Complaint   Patient presents with    Chest Pain    Aphasia   Transient aphasia    Interval hx:     No acute events overnight, no change in patient's neurological status. Her exam remains nonfocal.  MRI studies have been negative. She will remain on dual antiplatelets and then continue with Plavix. She needs to follow-up with her primary and recheck her lipids as her LDL was 179. Brief History of Present Illness:     Neurology was consulted for transient aphasia. She has a PMH of hypertension, COPD, diabetes and depression. Patient states that she was working the swing shift and had just gotten to work. She started feeling warm and noted that she could not get words out of her mouth. She was also sitting and was unable to get up. This lasted approximately 3-5 minutes. This was followed by a period of chest tightness along with numbness at the bottom of her jaw. She received nitro and had a repeat episode of shorter duration. She denied any visual changes, weakness or extremity numbness.     She had 1 similar episode approximately 1 month before which lasted for 2-3 minutes, she was apparently emotional at that time. She was taken for cardiac stress test with results unknown at this time. Currently, she does not display any focal neurological deficits. She denies any history of strokes or previous seizures.      CT head and CTA of the head neck did not show any LVO, plaque formation or acute intracranial abnormality. At baseline, she is functional and able to perform her daily activities without any impairment.   She was a previous about 0.50 packs per day. She does not have any smokeless tobacco history on file. Alcohol:      reports no history of alcohol use. Drug Use:  reports no history of drug use. Family History:     History reviewed. No pertinent family history. Review of Systems:     ROS:  Constitutional  Negative for fever and chills    HEENT  Negative for ear discharge, ear pain, nosebleed    Eyes  Negative for photophobia, pain and discharge    Respiratory  Negative for hemoptysis and sputum    Cardiovascular  Negative for orthopnea, claudication and PND    Gastrointestinal  Negative for abdominal pain, diarrhea, blood in stool    Musculoskeletal  Negative for joint pain, negative for myalgia    Neurology Negative for seizures, loss of consciousness   Skin  Negative for rash or itching    Endo/heme/allergies  Negative for polydipsia, environmental allergy    Psychiatric/behavioral  Negative for suicidal ideation.  Patient is not anxious        Physical Exam:   BP (!) 157/94   Pulse 82   Temp 97.7 °F (36.5 °C) (Oral)   Resp 18   Ht 5' 7.01\" (1.702 m)   Wt 210 lb (95.3 kg)   SpO2 96%   BMI 32.88 kg/m²   Temp (24hrs), Av °F (36.7 °C), Min:97.7 °F (36.5 °C), Max:98.2 °F (36.8 °C)    Recent Labs     22  1619 22  2021 03/10/22  0708 03/10/22  1152   POCGLU 118* 169* 146* 122*     No intake or output data in the 24 hours ending 03/10/22 1615      NEUROLOGIC EXAMINATION  GENERAL  Appears comfortable and in no distress   HEENT  NC/ AT   NECK  Supple and no bruits heard   MENTAL STATUS:  Alert, oriented, intact memory, no confusion, normal speech, normal language, no hallucination or delusion   CRANIAL NERVES: II     -      Visual fields intact to confrontation  III,IV,VI -  EOMs full, no afferent defect, no AIMEE, no ptosis  V     -     Normal facial sensation  VII    -     Normal facial symmetry  VIII   -     Intact hearing  IX,X -     Symmetrical palate  XI    -     Symmetrical shoulder shrug  XII   - Midline tongue, no atrophy    MOTOR FUNCTION:  significant for good strength of grade 5/5 in bilateral proximal and distal muscle groups of both upper and lower extremities with normal bulk, normal tone and no involuntary movements, no tremor   SENSORY FUNCTION:  Normal touch, normal pin, normal vibration, normal proprioception   CEREBELLAR FUNCTION:  Intact fine motor control over upper limbs   REFLEX FUNCTION:  Symmetric, no perverted reflex, no Babinski sign   STATION and GAIT  Not tested       Investigations:      Laboratory Testing:  Recent Results (from the past 24 hour(s))   POC Glucose Fingerstick    Collection Time: 03/09/22  4:19 PM   Result Value Ref Range    POC Glucose 118 (H) 65 - 105 mg/dL   POC Glucose Fingerstick    Collection Time: 03/09/22  8:21 PM   Result Value Ref Range    POC Glucose 169 (H) 65 - 105 mg/dL   LIPID PANEL    Collection Time: 03/10/22  5:26 AM   Result Value Ref Range    Cholesterol 266 (H) <200 mg/dL    HDL 40 (L) >40 mg/dL    LDL Cholesterol 179 (H) 0 - 130 mg/dL    Chol/HDL Ratio 6.7 (H) <5    Triglycerides 235 (H) <150 mg/dL   POC Glucose Fingerstick    Collection Time: 03/10/22  7:08 AM   Result Value Ref Range    POC Glucose 146 (H) 65 - 105 mg/dL   POC Glucose Fingerstick    Collection Time: 03/10/22 11:52 AM   Result Value Ref Range    POC Glucose 122 (H) 65 - 105 mg/dL         Assessment :      Primary Problem  Chest pain    Active Hospital Problems    Diagnosis Date Noted    Aphasia [R47.01]     TIA (transient ischemic attack) [G45.9]     Chest pain [R07.9] 03/08/2022       Plan:     33-year-old with transient aphasia, history of previous smoking. MRI negative for any acute injury, she will be placed on dual antiplatelet therapy along with statin therapies and follow-up as outpatient.     Echocardiogram60%     NeurologicalTIA  -Continue to monitor for any neurological changes  -Continue dual antiplatelet therapy I84 days  -Then continue Plavix daily.  -Statin therapy  -Outpatient follow-up  -Follow-up with primary physician regarding blood pressure control    Follow-up further recommendations after discussing the case with attending  The plan was discussed with the patient, patient's family and the medical staff. Consultations:   IP CONSULT TO NEUROLOGY    Patient is admitted as inpatient status because of co-morbidities listed above, severity of signs and symptoms as outlined, requirement for current medical therapies and most importantly because of direct risk to patient if care not provided in a hospital setting.     Charo Castaneda MD  3/10/2022  4:15 PM    Copy sent to Dr. Jordan Long MD

## 2022-03-11 ENCOUNTER — CARE COORDINATION (OUTPATIENT)
Dept: CASE MANAGEMENT | Age: 62
End: 2022-03-11

## 2022-03-11 NOTE — CARE COORDINATION
you been contacted by a [address]?: No  Have you scheduled your follow up appointment?: Yes  How are you going to get to your appointment?: Car - drive self  Were you discharged with any Home Care or Post Acute Services: No  Do you feel like you have everything you need to keep you well at home?: Yes  Care Transitions Interventions         Follow Up  No future appointments.     Yumiko Young, RN

## 2023-04-13 NOTE — PLAN OF CARE
Problem: Falls - Risk of:  Goal: Will remain free from falls  Description: Will remain free from falls  Outcome: Ongoing  Goal: Absence of physical injury  Description: Absence of physical injury  Outcome: Ongoing     Problem: Infection:  Goal: Will remain free from infection  Description: Will remain free from infection  Outcome: Ongoing     Problem: Safety:  Goal: Free from accidental physical injury  Description: Free from accidental physical injury  Outcome: Ongoing  Goal: Free from intentional harm  Description: Free from intentional harm  Outcome: Ongoing     Problem: Daily Care:  Goal: Daily care needs are met  Description: Daily care needs are met  Outcome: Ongoing     Problem: Pain:  Goal: Patient's pain/discomfort is manageable  Description: Patient's pain/discomfort is manageable  Outcome: Ongoing  Goal: Pain level will decrease  Description: Pain level will decrease  Outcome: Ongoing  Goal: Control of acute pain  Description: Control of acute pain  Outcome: Ongoing  Goal: Control of chronic pain  Description: Control of chronic pain  Outcome: Ongoing     Problem: Skin Integrity:  Goal: Skin integrity will stabilize  Description: Skin integrity will stabilize  Outcome: Ongoing     Problem: Discharge Planning:  Goal: Patients continuum of care needs are met  Description: Patients continuum of care needs are met  Outcome: Ongoing Chronic kidney disease (CKD) Fever